# Patient Record
Sex: MALE | Race: WHITE | Employment: FULL TIME | URBAN - METROPOLITAN AREA
[De-identification: names, ages, dates, MRNs, and addresses within clinical notes are randomized per-mention and may not be internally consistent; named-entity substitution may affect disease eponyms.]

---

## 2018-08-24 NOTE — LETTER
Here is the plan from today's visit    1. Benign essential hypertension  Discussed with and benefits of treating versus not treating hypertension potential adverse side effects of medication restarting asked him to follow-up for rechecking blood pressure and BMP.  - lisinopril (PRINIVIL/ZESTRIL) 5 MG tablet; Take 1 tablet (5 mg) by mouth daily  Dispense: 30 tablet; Refill: 1  - Comprehensive Metabolic Panel ([facility])  - Lipid Cascade ([facility])  - Hemoglobin A1c ([facility])    2. Dysfunction of right eustachian tube  Likely the result of allergic rhinitis.  Advised treatment with OTC Flonase, OTC Allegra and OTC Sudafed.    3. Obesity (BMI 30.0-34.9)  Discussed in detail regarding starting a diet in defining abstinence for himself.  Advised him he could either go with a low carbohydrate diet or Mediterranean diet.  Or go with the program at [facility] since he has signed up already.  Advised him to follow-up in about 4-6 weeks to see how things are going and consider adjunctive medication if things are if he is not progressing.      Please call or return to clinic if your symptoms don't go away.    Follow up plan  Please make a clinic appointment for follow up with me (DEEPA GARCIA) in 2-4 weeks for recheck.    Thank you for coming to [facility] today.  Lab Testing:  **If you had lab testing today and your results are reassuring or normal they will be mailed to you or sent through [employer] within 7 days.   **If the lab tests need quick action we will call you with the results.  The phone number we will call with results is # [phone] (home) [phone] (work). If this is not the best number please call our clinic and change the number.  Medication Refills:  If you need any refills please call your pharmacy and they will contact us.   If you need to  your refill at a new pharmacy, please contact the new pharmacy directly. The new pharmacy will help you get your medications transferred faster.  [place] [address], [place], [place]    Authorization for Surgical Operation and Procedure                               I hereby authorize Maria L Montero MD, my physician and his/her assistants (if applicable), which may include medical students, residents, and/or fellows, to perform the following surgical operation/ procedure and administer such anesthesia as may be determined necessary by my physician: Operation/Procedure name (s) COLONOSCOPY on Ridge Ambrocio   2.   I recognize that during the surgical operation/procedure, unforeseen conditions may necessitate additional or different procedures than those listed above.  I, therefore, further authorize and request that the above-named surgeon, assistants, or designees perform such procedures as are, in their judgment, necessary and desirable.    3.   My surgeon/physician has discussed prior to my surgery the potential benefits, risks and side effects of this procedure; the likelihood of achieving goals; and potential problems that might occur during recuperation.  They also discussed reasonable alternatives to the procedure, including risks, benefits, and side effects related to the alternatives and risks related to not receiving this procedure.  I have had all my questions answered and I acknowledge that no guarantee has been made as to the result that may be obtained.    4.   Should the need arise during my operation/procedure, which includes change of level of care prior to discharge, I also consent to the administration of blood and/or blood products.  Further, I understand that despite careful testing and screening of blood or blood products by collecting agencies, I may still be subject to ill effects as a result of receiving a blood transfusion and/or blood products.  The following are some, but not all, of the potential risks that can occur: fever and allergic reactions, hemolytic reactions, transmission of    Scheduling:  If you have any concerns about today's visit or wish to schedule another appointment please call our office during normal business hours [phone] (8-5:00 M-F)  If a referral was made to a [facility] and you don't get a call from central scheduling please call [phone].  If a Mammogram was ordered for you at [facility] call [phone] to schedule or change your appointment.  If you had an XRay/CT/Ultrasound/MRI ordered the number is [phone] to schedule or change your radiology appointment.   Medical Concerns:  If you have urgent medical concerns please call [phone] at any time of the day.    Richy Dorsey MD     diseases such as Hepatitis, AIDS and Cytomegalovirus (CMV) and fluid overload.  In the event that I wish to have an autologous transfusion of my own blood, or a directed donor transfusion, I will discuss this with my physician.  Check only if Refusing Blood or Blood Products  I understand refusal of blood or blood products as deemed necessary by my physician may have serious consequences to my condition to include possible death. I hereby assume responsibility for my refusal and release the hospital, its personnel, and my physicians from any responsibility for the consequences of my refusal.    o  Refuse   5.   I authorize the use of any specimen, organs, tissues, body parts or foreign objects that may be removed from my body during the operation/procedure for diagnosis, research or teaching purposes and their subsequent disposal by hospital authorities.  I also authorize the release of specimen test results and/or written reports to my treating physician on the hospital medical staff or other referring or consulting physicians involved in my care, at the discretion of the Pathologist or my treating physician.    6.   I consent to the photographing or videotaping of the operations or procedures to be performed, including appropriate portions of my body for medical, scientific, or educational purposes, provided my identity is not revealed by the pictures or by descriptive texts accompanying them.  If the procedure has been photographed/videotaped, the surgeon will obtain the original picture, image, videotape or CD.  The hospital will not be responsible for storage, release or maintenance of the picture, image, tape or CD.    7.   I consent to the presence of a  or observers in the operating room as deemed necessary by my physician or their designees.    8.   I recognize that in the event my procedure results in extended X-Ray/fluoroscopy time, I may develop a skin reaction.    9. If I have a Do Not  Attempt Resuscitation (DNAR) order in place, that status will be suspended while in the operating room, procedural suite, and during the recovery period unless otherwise explicitly stated by me (or a person authorized to consent on my behalf). The surgeon or my attending physician will determine when the applicable recovery period ends for purposes of reinstating the DNAR order.  10. Patients having a sterilization procedure: I understand that if the procedure is successful the results will be permanent and it will therefore be impossible for me to inseminate, conceive, or bear children.  I also understand that the procedure is intended to result in sterility, although the result has not been guaranteed.   11. I acknowledge that my physician has explained sedation/analgesia administration to me including the risk and benefits I consent to the administration of sedation/analgesia as may be necessary or desirable in the judgment of my physician.    I CERTIFY THAT I HAVE READ AND FULLY UNDERSTAND THE ABOVE CONSENT TO OPERATION and/or OTHER PROCEDURE.     ____________________________________  _________________________________        ______________________________  Signature of Patient    Signature of Responsible Person                Printed Name of Responsible Person                                      ____________________________________  _____________________________                ________________________________  Signature of Witness        Date  Time         Relationship to Patient    STATEMENT OF PHYSICIAN My signature below affirms that prior to the time of the procedure; I have explained to the patient and/or his/her legal representative, the risks and benefits involved in the proposed treatment and any reasonable alternative to the proposed treatment. I have also explained the risks and benefits involved in refusal of the proposed treatment and alternatives to the proposed treatment and have answered the  patient's questions. If I have a significant financial interest in a co-management agreement or a significant financial interest in any product or implant, or other significant relationship used in this procedure/surgery, I have disclosed this and had a discussion with my patient.     _____________________________________________________              _____________________________  (Signature of Physician)                                                                                         (Date)                                   (Time)  Patient Name: Ridge Ambrocio      : 1980      Printed: 2025     Medical Record #: [id number]                                      Page 1 of 1

## 2018-10-30 ENCOUNTER — LAB ENCOUNTER (OUTPATIENT)
Dept: LAB | Facility: REFERENCE LAB | Age: 38
End: 2018-10-30
Attending: FAMILY MEDICINE
Payer: COMMERCIAL

## 2018-10-30 ENCOUNTER — OFFICE VISIT (OUTPATIENT)
Dept: FAMILY MEDICINE CLINIC | Facility: CLINIC | Age: 38
End: 2018-10-30
Payer: COMMERCIAL

## 2018-10-30 VITALS
OXYGEN SATURATION: 98 % | DIASTOLIC BLOOD PRESSURE: 68 MMHG | HEART RATE: 93 BPM | SYSTOLIC BLOOD PRESSURE: 122 MMHG | BODY MASS INDEX: 20.47 KG/M2 | WEIGHT: 143 LBS | HEIGHT: 70 IN

## 2018-10-30 DIAGNOSIS — G25.0 BENIGN ESSENTIAL TREMOR: ICD-10-CM

## 2018-10-30 DIAGNOSIS — Z23 NEED FOR VACCINATION: ICD-10-CM

## 2018-10-30 DIAGNOSIS — Z00.01 ENCOUNTER FOR ROUTINE ADULT HEALTH EXAMINATION WITH ABNORMAL FINDINGS: ICD-10-CM

## 2018-10-30 DIAGNOSIS — Z00.01 ENCOUNTER FOR ROUTINE ADULT HEALTH EXAMINATION WITH ABNORMAL FINDINGS: Primary | ICD-10-CM

## 2018-10-30 PROCEDURE — 90686 IIV4 VACC NO PRSV 0.5 ML IM: CPT | Performed by: FAMILY MEDICINE

## 2018-10-30 PROCEDURE — 80061 LIPID PANEL: CPT | Performed by: FAMILY MEDICINE

## 2018-10-30 PROCEDURE — 36415 COLL VENOUS BLD VENIPUNCTURE: CPT | Performed by: FAMILY MEDICINE

## 2018-10-30 PROCEDURE — 90471 IMMUNIZATION ADMIN: CPT | Performed by: FAMILY MEDICINE

## 2018-10-30 PROCEDURE — 80050 GENERAL HEALTH PANEL: CPT | Performed by: FAMILY MEDICINE

## 2018-10-30 PROCEDURE — 83036 HEMOGLOBIN GLYCOSYLATED A1C: CPT | Performed by: FAMILY MEDICINE

## 2018-10-30 PROCEDURE — 99385 PREV VISIT NEW AGE 18-39: CPT | Performed by: FAMILY MEDICINE

## 2018-10-30 NOTE — PROGRESS NOTES
Corine Flanagan is a 43year old male who presents for a complete physical exam.   HPI:     Concerns: Has had a slight tremor that is worse in the morning and not every day. Feels it only with controlled actions.  Has been present for a few weeks but ne Drug use: No     Occ: [employer], works in sales currently for industrial engineering  : Yes Children: two sons        EXAM:   Wt Readings from Last 6 Encounters:  10/30/18 : 143 lb    Body mass index is 20.52 kg/m².     /68   Pulse 93   Ht 7 of the week   -Importance of regular exercise and weight loss  -Diabetes screening which he desires  -Cholesterol screening which he desires  -Recommendation for yearly influenza vaccine  -Need for Tdap once as an adult and Td booster every 10 years  -Need

## 2018-10-30 NOTE — PATIENT INSTRUCTIONS
Prevention Guidelines, Men Ages 25 to 44  Screening tests and vaccines are an important part of managing your health. A screening test is done to find possible disorders or diseases in people who don't have any symptoms.  The goal is to find a disease ear Vaccines Who needs it How often   Chickenpox (varicella) All men in this age group who have no record of this infection or vaccine 2 doses; the second dose should be given at least 4 weeks after the first dose   Hepatitis A Men at increased risk for infect Sexually transmitted infection prevention Men who are sexually active At routine exams   Skin cancer Prevention of skin cancer in fair-skinned adults through age 25 At routine exams   1Those who are 25years of age, who are not up-to-date on their childhoo The cause of ET isn’t known. However, one theory suggests that your cerebellum and other parts of your brain are not communicating correctly. The cerebellum is a part of the brain that controls muscle coordination. What are the symptoms of ET?   If you hav Propanolol and primidone are 2 medicines often prescribed to treat ET. Propanolol blocks the stimulating action of neurotransmitters to calm your trembling. Primidone is a common antiseizure medicine that also controls the actions of neurotransmitters.   Shady Morillo · ET is a neurological disorder that causes your hands, head, trunk, voice, or legs to shake rhythmically. The cause is not known, but it is often passed down from a parent to a child.   · ET is sometimes confused with other types of tremor, so getting the

## 2018-12-26 ENCOUNTER — OFFICE VISIT (OUTPATIENT)
Dept: FAMILY MEDICINE CLINIC | Facility: CLINIC | Age: 38
End: 2018-12-26
Payer: COMMERCIAL

## 2018-12-26 ENCOUNTER — APPOINTMENT (OUTPATIENT)
Dept: LAB | Facility: REFERENCE LAB | Age: 38
End: 2018-12-26
Attending: FAMILY MEDICINE
Payer: COMMERCIAL

## 2018-12-26 VITALS
DIASTOLIC BLOOD PRESSURE: 70 MMHG | BODY MASS INDEX: 20.33 KG/M2 | HEART RATE: 72 BPM | SYSTOLIC BLOOD PRESSURE: 128 MMHG | HEIGHT: 70 IN | WEIGHT: 142 LBS | OXYGEN SATURATION: 98 %

## 2018-12-26 DIAGNOSIS — Z91.89 RISK OF EXPOSURE TO LYME DISEASE: Primary | ICD-10-CM

## 2018-12-26 DIAGNOSIS — R07.81 RIB PAIN ON LEFT SIDE: ICD-10-CM

## 2018-12-26 DIAGNOSIS — Z91.89 RISK OF EXPOSURE TO LYME DISEASE: ICD-10-CM

## 2018-12-26 PROCEDURE — 86618 LYME DISEASE ANTIBODY: CPT | Performed by: FAMILY MEDICINE

## 2018-12-26 PROCEDURE — 36415 COLL VENOUS BLD VENIPUNCTURE: CPT | Performed by: FAMILY MEDICINE

## 2018-12-26 PROCEDURE — 99214 OFFICE O/P EST MOD 30 MIN: CPT | Performed by: FAMILY MEDICINE

## 2018-12-26 NOTE — PROGRESS NOTES
CC:  Patient presents with:  Trauma (cardiovascular, musculoskeletal): patient states he dropped some weight on his chest at gym, states his left ribs look different and sometimes sore  Other: wife had a positive test for lyme disease and would like to get Transportation needs - non-medical: Not on file    Occupational History      Not on file    Tobacco Use      Smoking status: Never Smoker      Smokeless tobacco: Never Used    Substance and Sexual Activity      Alcohol use: Yes        Frequency: 4 or more his wife recently testing positive for chronic Lyme disease, will check testing today  - LYME DISEASE, TOTAL AB W RFLX; Future    2.  Rib pain on left side    - Improving with no signs of serious injury and no breathing difficulties  - Continue to monitor f

## 2021-08-31 ENCOUNTER — LAB ENCOUNTER (OUTPATIENT)
Dept: LAB | Facility: REFERENCE LAB | Age: 41
End: 2021-08-31
Attending: FAMILY MEDICINE
Payer: COMMERCIAL

## 2021-08-31 ENCOUNTER — OFFICE VISIT (OUTPATIENT)
Dept: FAMILY MEDICINE CLINIC | Facility: CLINIC | Age: 41
End: 2021-08-31
Payer: COMMERCIAL

## 2021-08-31 VITALS
BODY MASS INDEX: 20.33 KG/M2 | DIASTOLIC BLOOD PRESSURE: 78 MMHG | WEIGHT: 142 LBS | OXYGEN SATURATION: 98 % | SYSTOLIC BLOOD PRESSURE: 126 MMHG | HEIGHT: 70 IN | HEART RATE: 79 BPM

## 2021-08-31 DIAGNOSIS — Z00.00 ENCOUNTER FOR ROUTINE ADULT HEALTH EXAMINATION WITHOUT ABNORMAL FINDINGS: Primary | ICD-10-CM

## 2021-08-31 DIAGNOSIS — R51.9 RECURRENT OCCIPITAL HEADACHE: ICD-10-CM

## 2021-08-31 DIAGNOSIS — Z00.00 ENCOUNTER FOR ROUTINE ADULT HEALTH EXAMINATION WITHOUT ABNORMAL FINDINGS: ICD-10-CM

## 2021-08-31 LAB
ALBUMIN SERPL-MCNC: 3.9 G/DL (ref 3.4–5)
ALBUMIN/GLOB SERPL: 1 {RATIO} (ref 1–2)
ALP LIVER SERPL-CCNC: 53 U/L
ALT SERPL-CCNC: 39 U/L
ANION GAP SERPL CALC-SCNC: 3 MMOL/L (ref 0–18)
AST SERPL-CCNC: 23 U/L (ref 15–37)
BASOPHILS # BLD AUTO: 0.04 X10(3) UL (ref 0–0.2)
BASOPHILS NFR BLD AUTO: 0.9 %
BILIRUB SERPL-MCNC: 0.5 MG/DL (ref 0.1–2)
BUN BLD-MCNC: 9 MG/DL (ref 7–18)
BUN/CREAT SERPL: 10.8 (ref 10–20)
CALCIUM BLD-MCNC: 8.9 MG/DL (ref 8.5–10.1)
CHLORIDE SERPL-SCNC: 103 MMOL/L (ref 98–112)
CHOLEST SMN-MCNC: 211 MG/DL (ref ?–200)
CO2 SERPL-SCNC: 30 MMOL/L (ref 21–32)
COMPLEXED PSA SERPL-MCNC: 0.62 NG/ML (ref ?–4)
CREAT BLD-MCNC: 0.83 MG/DL
DEPRECATED RDW RBC AUTO: 36.9 FL (ref 35.1–46.3)
EOSINOPHIL # BLD AUTO: 0.15 X10(3) UL (ref 0–0.7)
EOSINOPHIL NFR BLD AUTO: 3.5 %
ERYTHROCYTE [DISTWIDTH] IN BLOOD BY AUTOMATED COUNT: 11.6 % (ref 11–15)
EST. AVERAGE GLUCOSE BLD GHB EST-MCNC: 100 MG/DL (ref 68–126)
GLOBULIN PLAS-MCNC: 3.8 G/DL (ref 2.8–4.4)
GLUCOSE BLD-MCNC: 94 MG/DL (ref 70–99)
HBA1C MFR BLD HPLC: 5.1 % (ref ?–5.7)
HCT VFR BLD AUTO: 43.7 %
HCV AB SERPL QL IA: NONREACTIVE
HDLC SERPL-MCNC: 72 MG/DL (ref 40–59)
HGB BLD-MCNC: 14.6 G/DL
IMM GRANULOCYTES # BLD AUTO: 0.01 X10(3) UL (ref 0–1)
IMM GRANULOCYTES NFR BLD: 0.2 %
LDLC SERPL CALC-MCNC: 124 MG/DL (ref ?–100)
LYMPHOCYTES # BLD AUTO: 1.12 X10(3) UL (ref 1–4)
LYMPHOCYTES NFR BLD AUTO: 25.9 %
M PROTEIN MFR SERPL ELPH: 7.7 G/DL (ref 6.4–8.2)
MCH RBC QN AUTO: 28.9 PG (ref 26–34)
MCHC RBC AUTO-ENTMCNC: 33.4 G/DL (ref 31–37)
MCV RBC AUTO: 86.5 FL
MONOCYTES # BLD AUTO: 0.32 X10(3) UL (ref 0.1–1)
MONOCYTES NFR BLD AUTO: 7.4 %
NEUTROPHILS # BLD AUTO: 2.68 X10 (3) UL (ref 1.5–7.7)
NEUTROPHILS # BLD AUTO: 2.68 X10(3) UL (ref 1.5–7.7)
NEUTROPHILS NFR BLD AUTO: 62.1 %
NONHDLC SERPL-MCNC: 139 MG/DL (ref ?–130)
OSMOLALITY SERPL CALC.SUM OF ELEC: 280 MOSM/KG (ref 275–295)
PATIENT FASTING Y/N/NP: YES
PATIENT FASTING Y/N/NP: YES
PLATELET # BLD AUTO: 171 10(3)UL (ref 150–450)
POTASSIUM SERPL-SCNC: 3.9 MMOL/L (ref 3.5–5.1)
RBC # BLD AUTO: 5.05 X10(6)UL
SODIUM SERPL-SCNC: 136 MMOL/L (ref 136–145)
TRIGL SERPL-MCNC: 85 MG/DL (ref 30–149)
VLDLC SERPL CALC-MCNC: 15 MG/DL (ref 0–30)
WBC # BLD AUTO: 4.3 X10(3) UL (ref 4–11)

## 2021-08-31 PROCEDURE — 3078F DIAST BP <80 MM HG: CPT | Performed by: FAMILY MEDICINE

## 2021-08-31 PROCEDURE — 3074F SYST BP LT 130 MM HG: CPT | Performed by: FAMILY MEDICINE

## 2021-08-31 PROCEDURE — 80053 COMPREHEN METABOLIC PANEL: CPT

## 2021-08-31 PROCEDURE — 85025 COMPLETE CBC W/AUTO DIFF WBC: CPT

## 2021-08-31 PROCEDURE — 80061 LIPID PANEL: CPT

## 2021-08-31 PROCEDURE — 99396 PREV VISIT EST AGE 40-64: CPT | Performed by: FAMILY MEDICINE

## 2021-08-31 PROCEDURE — 36415 COLL VENOUS BLD VENIPUNCTURE: CPT

## 2021-08-31 PROCEDURE — 3008F BODY MASS INDEX DOCD: CPT | Performed by: FAMILY MEDICINE

## 2021-08-31 PROCEDURE — 83036 HEMOGLOBIN GLYCOSYLATED A1C: CPT

## 2021-08-31 PROCEDURE — 86803 HEPATITIS C AB TEST: CPT

## 2021-08-31 NOTE — PROGRESS NOTES
Yamil Gross is a 42year old male who presents for a complete physical exam.   HPI:     Concerns: Had a sinus infection in July and was treated with Augmentin for 10 days. Symptoms have mostly improved.    Has also had some intermittent right sided Grandmother         Alzheimer's   • Dementia Paternal Grandmother         Lewy Body    • Colon Cancer Paternal Grandfather    • Other (Parkinson's) Maternal Uncle       Social History:  Social History    Tobacco Use      Smoking status: Never Smoker      S HCV Antibody [E]      Hemoglobin A1C (Glycohemoglobin) [E]      CBC W Differential W Platelet [E]    Recommend continuing heat and stretches for occipital headaches.  Neurologic exam normal today and no red flag symptoms present so will defer imaging, but n

## 2022-10-12 ENCOUNTER — LAB ENCOUNTER (OUTPATIENT)
Dept: LAB | Age: 42
End: 2022-10-12
Attending: FAMILY MEDICINE
Payer: COMMERCIAL

## 2022-10-12 ENCOUNTER — NURSE ONLY (OUTPATIENT)
Dept: FAMILY MEDICINE CLINIC | Facility: CLINIC | Age: 42
End: 2022-10-12
Payer: COMMERCIAL

## 2022-10-12 DIAGNOSIS — Z01.84 IMMUNITY STATUS TESTING: ICD-10-CM

## 2022-10-12 LAB
RUBV IGG SER QL: POSITIVE
RUBV IGG SER-ACNC: 89.5 IU/ML (ref 10–?)

## 2022-10-12 PROCEDURE — 86615 BORDETELLA ANTIBODY: CPT

## 2022-10-12 PROCEDURE — 86648 DIPHTHERIA ANTIBODY: CPT

## 2022-10-12 PROCEDURE — 90715 TDAP VACCINE 7 YRS/> IM: CPT | Performed by: FAMILY MEDICINE

## 2022-10-12 PROCEDURE — 86762 RUBELLA ANTIBODY: CPT

## 2022-10-12 PROCEDURE — 90471 IMMUNIZATION ADMIN: CPT | Performed by: FAMILY MEDICINE

## 2022-10-12 PROCEDURE — 36415 COLL VENOUS BLD VENIPUNCTURE: CPT

## 2022-10-12 PROCEDURE — 86735 MUMPS ANTIBODY: CPT

## 2022-10-12 PROCEDURE — 86774 TETANUS ANTIBODY: CPT

## 2022-10-12 PROCEDURE — 86765 RUBEOLA ANTIBODY: CPT

## 2022-10-14 LAB
B. PERTUSSIS AB, IGA W/REFLEX: 0.2 IV
B. PERTUSSIS AB, IGG W/ REFLEX: 1.53 IV
DIPHTHERIA ANTIBODY, IGG: 0.5 IU/ML
MEV IGG SER-ACNC: 198 AU/ML (ref 16.5–?)
MUV IGG SER IA-ACNC: 64.9 AU/ML (ref 11–?)
TETANUS ANTIBODY, IGG: 2.6 IU/ML

## 2022-10-15 LAB — B. PERTUSSIS, IGG IB PT: POSITIVE

## 2022-12-07 ENCOUNTER — OFFICE VISIT (OUTPATIENT)
Dept: FAMILY MEDICINE CLINIC | Facility: CLINIC | Age: 42
End: 2022-12-07
Payer: COMMERCIAL

## 2022-12-07 VITALS
HEIGHT: 70 IN | HEART RATE: 67 BPM | WEIGHT: 155 LBS | SYSTOLIC BLOOD PRESSURE: 122 MMHG | DIASTOLIC BLOOD PRESSURE: 76 MMHG | BODY MASS INDEX: 22.19 KG/M2 | OXYGEN SATURATION: 97 %

## 2022-12-07 DIAGNOSIS — M79.644 BILATERAL THUMB PAIN: ICD-10-CM

## 2022-12-07 DIAGNOSIS — M79.652 PAIN OF LEFT THIGH: ICD-10-CM

## 2022-12-07 DIAGNOSIS — Z12.5 PROSTATE CANCER SCREENING: ICD-10-CM

## 2022-12-07 DIAGNOSIS — M79.645 BILATERAL THUMB PAIN: ICD-10-CM

## 2022-12-07 DIAGNOSIS — Z00.00 ENCOUNTER FOR ROUTINE ADULT HEALTH EXAMINATION WITHOUT ABNORMAL FINDINGS: Primary | ICD-10-CM

## 2022-12-07 PROCEDURE — 3074F SYST BP LT 130 MM HG: CPT | Performed by: FAMILY MEDICINE

## 2022-12-07 PROCEDURE — 99396 PREV VISIT EST AGE 40-64: CPT | Performed by: FAMILY MEDICINE

## 2022-12-07 PROCEDURE — 3008F BODY MASS INDEX DOCD: CPT | Performed by: FAMILY MEDICINE

## 2022-12-07 PROCEDURE — 3078F DIAST BP <80 MM HG: CPT | Performed by: FAMILY MEDICINE

## 2023-07-25 ENCOUNTER — LAB ENCOUNTER (OUTPATIENT)
Dept: LAB | Facility: REFERENCE LAB | Age: 43
End: 2023-07-25
Attending: FAMILY MEDICINE
Payer: COMMERCIAL

## 2023-07-25 ENCOUNTER — OFFICE VISIT (OUTPATIENT)
Facility: CLINIC | Age: 43
End: 2023-07-25
Payer: COMMERCIAL

## 2023-07-25 VITALS
WEIGHT: 153 LBS | OXYGEN SATURATION: 98 % | HEIGHT: 70 IN | SYSTOLIC BLOOD PRESSURE: 126 MMHG | DIASTOLIC BLOOD PRESSURE: 78 MMHG | BODY MASS INDEX: 21.9 KG/M2 | HEART RATE: 75 BPM

## 2023-07-25 DIAGNOSIS — M54.50 CHRONIC BILATERAL LOW BACK PAIN WITHOUT SCIATICA: ICD-10-CM

## 2023-07-25 DIAGNOSIS — G89.29 CHRONIC BILATERAL LOW BACK PAIN WITHOUT SCIATICA: ICD-10-CM

## 2023-07-25 DIAGNOSIS — Z00.00 ENCOUNTER FOR ROUTINE ADULT HEALTH EXAMINATION WITHOUT ABNORMAL FINDINGS: ICD-10-CM

## 2023-07-25 DIAGNOSIS — Z00.00 ENCOUNTER FOR ROUTINE ADULT HEALTH EXAMINATION WITHOUT ABNORMAL FINDINGS: Primary | ICD-10-CM

## 2023-07-25 DIAGNOSIS — Z12.5 PROSTATE CANCER SCREENING: ICD-10-CM

## 2023-07-25 DIAGNOSIS — N50.89 TESTICULAR MASS: ICD-10-CM

## 2023-07-25 DIAGNOSIS — M54.6 ACUTE BILATERAL THORACIC BACK PAIN: ICD-10-CM

## 2023-07-25 LAB
ALBUMIN SERPL-MCNC: 3.4 G/DL (ref 3.4–5)
ALBUMIN/GLOB SERPL: 1 {RATIO} (ref 1–2)
ALP LIVER SERPL-CCNC: 63 U/L
ALT SERPL-CCNC: 41 U/L
ANION GAP SERPL CALC-SCNC: 5 MMOL/L (ref 0–18)
AST SERPL-CCNC: 25 U/L (ref 15–37)
BASOPHILS # BLD AUTO: 0.06 X10(3) UL (ref 0–0.2)
BASOPHILS NFR BLD AUTO: 1 %
BILIRUB SERPL-MCNC: 0.3 MG/DL (ref 0.1–2)
BUN BLD-MCNC: 13 MG/DL (ref 7–18)
BUN/CREAT SERPL: 13.3 (ref 10–20)
CALCIUM BLD-MCNC: 8.8 MG/DL (ref 8.5–10.1)
CHLORIDE SERPL-SCNC: 105 MMOL/L (ref 98–112)
CHOLEST SERPL-MCNC: 181 MG/DL (ref ?–200)
CO2 SERPL-SCNC: 29 MMOL/L (ref 21–32)
COMPLEXED PSA SERPL-MCNC: 0.92 NG/ML (ref ?–4)
CREAT BLD-MCNC: 0.98 MG/DL
DEPRECATED RDW RBC AUTO: 35.2 FL (ref 35.1–46.3)
EGFRCR SERPLBLD CKD-EPI 2021: 98 ML/MIN/1.73M2 (ref 60–?)
EOSINOPHIL # BLD AUTO: 0.52 X10(3) UL (ref 0–0.7)
EOSINOPHIL NFR BLD AUTO: 8.3 %
ERYTHROCYTE [DISTWIDTH] IN BLOOD BY AUTOMATED COUNT: 11.2 % (ref 11–15)
EST. AVERAGE GLUCOSE BLD GHB EST-MCNC: 97 MG/DL (ref 68–126)
FASTING PATIENT LIPID ANSWER: NO
FASTING STATUS PATIENT QL REPORTED: NO
GLOBULIN PLAS-MCNC: 3.5 G/DL (ref 2.8–4.4)
GLUCOSE BLD-MCNC: 106 MG/DL (ref 70–99)
HBA1C MFR BLD: 5 % (ref ?–5.7)
HCT VFR BLD AUTO: 40.5 %
HDLC SERPL-MCNC: 58 MG/DL (ref 40–59)
HGB BLD-MCNC: 14 G/DL
IMM GRANULOCYTES # BLD AUTO: 0.02 X10(3) UL (ref 0–1)
IMM GRANULOCYTES NFR BLD: 0.3 %
LDLC SERPL CALC-MCNC: 97 MG/DL (ref ?–100)
LYMPHOCYTES # BLD AUTO: 1.89 X10(3) UL (ref 1–4)
LYMPHOCYTES NFR BLD AUTO: 30.2 %
MCH RBC QN AUTO: 29.7 PG (ref 26–34)
MCHC RBC AUTO-ENTMCNC: 34.6 G/DL (ref 31–37)
MCV RBC AUTO: 85.8 FL
MONOCYTES # BLD AUTO: 0.41 X10(3) UL (ref 0.1–1)
MONOCYTES NFR BLD AUTO: 6.6 %
NEUTROPHILS # BLD AUTO: 3.35 X10 (3) UL (ref 1.5–7.7)
NEUTROPHILS # BLD AUTO: 3.35 X10(3) UL (ref 1.5–7.7)
NEUTROPHILS NFR BLD AUTO: 53.6 %
NONHDLC SERPL-MCNC: 123 MG/DL (ref ?–130)
OSMOLALITY SERPL CALC.SUM OF ELEC: 289 MOSM/KG (ref 275–295)
PLATELET # BLD AUTO: 183 10(3)UL (ref 150–450)
POTASSIUM SERPL-SCNC: 3.6 MMOL/L (ref 3.5–5.1)
PROT SERPL-MCNC: 6.9 G/DL (ref 6.4–8.2)
RBC # BLD AUTO: 4.72 X10(6)UL
SODIUM SERPL-SCNC: 139 MMOL/L (ref 136–145)
TRIGL SERPL-MCNC: 153 MG/DL (ref 30–149)
VLDLC SERPL CALC-MCNC: 25 MG/DL (ref 0–30)
WBC # BLD AUTO: 6.3 X10(3) UL (ref 4–11)

## 2023-07-25 PROCEDURE — 80061 LIPID PANEL: CPT

## 2023-07-25 PROCEDURE — 3074F SYST BP LT 130 MM HG: CPT | Performed by: FAMILY MEDICINE

## 2023-07-25 PROCEDURE — 85025 COMPLETE CBC W/AUTO DIFF WBC: CPT

## 2023-07-25 PROCEDURE — 3078F DIAST BP <80 MM HG: CPT | Performed by: FAMILY MEDICINE

## 2023-07-25 PROCEDURE — 36415 COLL VENOUS BLD VENIPUNCTURE: CPT

## 2023-07-25 PROCEDURE — 3008F BODY MASS INDEX DOCD: CPT | Performed by: FAMILY MEDICINE

## 2023-07-25 PROCEDURE — 80053 COMPREHEN METABOLIC PANEL: CPT

## 2023-07-25 PROCEDURE — 99396 PREV VISIT EST AGE 40-64: CPT | Performed by: FAMILY MEDICINE

## 2023-07-25 PROCEDURE — 99213 OFFICE O/P EST LOW 20 MIN: CPT | Performed by: FAMILY MEDICINE

## 2023-07-25 PROCEDURE — 83036 HEMOGLOBIN GLYCOSYLATED A1C: CPT

## 2023-08-03 ENCOUNTER — TELEPHONE (OUTPATIENT)
Facility: CLINIC | Age: 43
End: 2023-08-03

## 2023-08-18 ENCOUNTER — HOSPITAL ENCOUNTER (OUTPATIENT)
Dept: ULTRASOUND IMAGING | Age: 43
Discharge: HOME | End: 2023-08-18
Attending: FAMILY MEDICINE
Payer: COMMERCIAL

## 2023-08-18 DIAGNOSIS — N50.89 TESTICULAR MASS: ICD-10-CM

## 2023-08-18 PROCEDURE — 76870 US EXAM SCROTUM: CPT | Performed by: FAMILY MEDICINE

## 2023-08-18 PROCEDURE — 93975 VASCULAR STUDY: CPT | Performed by: FAMILY MEDICINE

## 2023-08-25 ENCOUNTER — HOSPITAL ENCOUNTER (OUTPATIENT)
Dept: GENERAL RADIOLOGY | Age: 43
Discharge: HOME | End: 2023-08-25
Attending: FAMILY MEDICINE
Payer: COMMERCIAL

## 2023-08-25 DIAGNOSIS — G89.29 CHRONIC MIDLINE THORACIC BACK PAIN: ICD-10-CM

## 2023-08-25 DIAGNOSIS — M54.6 CHRONIC MIDLINE THORACIC BACK PAIN: ICD-10-CM

## 2023-08-25 DIAGNOSIS — M54.50 LUMBAR BACK PAIN: ICD-10-CM

## 2023-08-25 PROCEDURE — 72072 X-RAY EXAM THORAC SPINE 3VWS: CPT | Performed by: FAMILY MEDICINE

## 2023-08-25 PROCEDURE — 72110 X-RAY EXAM L-2 SPINE 4/>VWS: CPT | Performed by: FAMILY MEDICINE

## 2023-10-11 ENCOUNTER — TELEPHONE (OUTPATIENT)
Dept: PHYSICAL THERAPY | Facility: HOSPITAL | Age: 43
End: 2023-10-11

## 2023-10-12 ENCOUNTER — OFFICE VISIT (OUTPATIENT)
Dept: PHYSICAL THERAPY | Facility: HOSPITAL | Age: 43
End: 2023-10-12
Attending: FAMILY MEDICINE
Payer: COMMERCIAL

## 2023-10-12 DIAGNOSIS — G89.29 CHRONIC BILATERAL THORACIC BACK PAIN: Primary | ICD-10-CM

## 2023-10-12 DIAGNOSIS — M54.6 CHRONIC BILATERAL THORACIC BACK PAIN: Primary | ICD-10-CM

## 2023-10-12 DIAGNOSIS — M54.50 LUMBAR BACK PAIN: ICD-10-CM

## 2023-10-12 PROCEDURE — 97162 PT EVAL MOD COMPLEX 30 MIN: CPT

## 2023-10-12 PROCEDURE — 97110 THERAPEUTIC EXERCISES: CPT

## 2023-10-17 NOTE — PROGRESS NOTES
Diagnosis:   Chronic bilateral thoracic back pain (M54.6,G89.29)  Lumbar back pain (M54.50)      Referring Provider: Joshua Burns  Date of Evaluation:    10/12/2023    Precautions:   Family history of CA and Heart attack  Next MD visit: none     Date of Surgery: n/a     Insurance Primary/Secondary: [address] [place] [address] / N/A     # Auth Visits: 5            Subjective: Pt was very sore after last session and has been more tender since. He took some time to recover before completing the exercises. Has felt more pain overall the past week. Pain: 4/10 beginning of session; after mobilization 2/10, 3/10 with extension       Objective: improved active lumbo-thoracic mobility just feels pull with R SB, ribs 5-8 more tender but less flared compared to first visit. Tenderness along anterior rib angle on left.     ----------  Objective info 10/12  AROM: (* denotes performed with pain)  Flexion: 80%, slight straining when returning to neutral (around shoulder blades)   Extension: 75%, slight strain when returning to neutral (around shoulder blades)  Sidebending: R 80%%; L 80%  Rotation: R 75%; L 75%* dull-sharp     Shoulder AROM: WNL  LE AROM: WNL    Accessory motion:   PA mob thoracic:decreased mobility, increased mm guarding along ~T7-10  Rib springing: decreased rib mobility in lower left ribs with increased discomfort    Palpation: positive rib flaring on left lower ribs    Gross Shoulder Strength: WNL, slight limitations in ER L>R  Gross LE Strength: WNL, slight limitations in hip flex   ----------    Assessment: Pt reported increased soreness after last session, therefore focused on gentle manual work today. Ribs ~5-8 on the left remain tender, but are less flared compared to the first visit. Additionally, he has tenderness along the anterior rib angle on his left and tenderness to the serratus anterior, oblique, lats on the left.  Updated HEP to promote improved mobility, and will continue to address mobility restrictions as able.       Goals:   Goals: (to be met in 10 visits)   Pt will improve transversus abdominis recruitment to perform proper isometric contraction without requiring verbal or tactile cuing to promote advancement of therex   Pt will demonstrate good understanding of proper posture and body mechanics to decrease pain and improve spinal safety   Pt to improve thoracic spine AROM to allow improved ability to inhale without increased pain. Pt will improve thoracolumbar spine AROM flexion to allow increase ease with bending forward to don shoes   Pt will have decreased paraspinal mm tension to tolerate sitting/standing >30 minutes for work and home activities   Pt will be independent and compliant with comprehensive HEP to maintain progress achieved in PT     Plan: Manual Therapy, Neuromuscular Re-education, Therapeutic Activities, Therapeutic Exercise, and Home Exercise Program instruction  Date: 10/18/2023  TX#: 2/10 Date:                 TX#: 3/ Date:                 TX#: 4/ Date:                 TX#: 5/ Date:    Tx#: 6/   MT 25'  G1-2 rib springing   G1-2 thoracic PA mob  STM obliques, scalenes, along rib angle anteriorly       TE 20'  Assessment- rib mobility, breathing assessment  Cat/cow x20  Standing against FR- swimmers and clams x20  Skip pose, fwd/ lat   Pt edu: HEP update                      HEP:   10/12 Exercises  - Seated Thoracic Lumbar Extension with Pectoralis Stretch  - 2-3 x daily - 7 x weekly - 10 reps  - Seated Thoracic Lumbar Extension  - 2-3 x daily - 7 x weekly - 10 reps  - Standing Thoracic Open Book at [address]  - 2-3 x daily - 7 x weekly - 10 reps  - Plank with Thoracic Rotation on Counter  - 2-3 x daily - 7 x weekly - 10 reps    Exercises  - Cat Cow  - 1 x daily - 7 x weekly - 20 reps  - Child's Pose Stretch  - 1 x daily - 7 x weekly - 10 reps - 10 sec hold  - Child's Pose with Sidebending  - 1 x daily - 7 x weekly - 10 reps - 10 sec  hold    Charges: 2 MT, 1 TE       Total Timed Treatment: 45 min  Total Treatment Time: 45 min

## 2023-10-18 ENCOUNTER — OFFICE VISIT (OUTPATIENT)
Dept: PHYSICAL THERAPY | Facility: HOSPITAL | Age: 43
End: 2023-10-18
Attending: FAMILY MEDICINE
Payer: COMMERCIAL

## 2023-10-18 PROCEDURE — 97140 MANUAL THERAPY 1/> REGIONS: CPT

## 2023-10-18 PROCEDURE — 97110 THERAPEUTIC EXERCISES: CPT

## 2023-10-25 ENCOUNTER — TELEPHONE (OUTPATIENT)
Dept: PHYSICAL THERAPY | Facility: HOSPITAL | Age: 43
End: 2023-10-25

## 2023-10-25 ENCOUNTER — PATIENT MESSAGE (OUTPATIENT)
Facility: CLINIC | Age: 43
End: 2023-10-25

## 2023-10-25 DIAGNOSIS — G89.29 CHRONIC MIDLINE THORACIC BACK PAIN: ICD-10-CM

## 2023-10-25 DIAGNOSIS — M54.6 CHRONIC MIDLINE THORACIC BACK PAIN: ICD-10-CM

## 2023-10-25 DIAGNOSIS — R61 NIGHT SWEATS: Primary | ICD-10-CM

## 2023-10-25 DIAGNOSIS — R07.89 CHEST WALL PAIN: ICD-10-CM

## 2023-10-26 ENCOUNTER — APPOINTMENT (OUTPATIENT)
Dept: PHYSICAL THERAPY | Facility: HOSPITAL | Age: 43
End: 2023-10-26
Attending: FAMILY MEDICINE
Payer: COMMERCIAL

## 2023-11-01 ENCOUNTER — APPOINTMENT (OUTPATIENT)
Dept: PHYSICAL THERAPY | Facility: HOSPITAL | Age: 43
End: 2023-11-01
Attending: FAMILY MEDICINE
Payer: COMMERCIAL

## 2023-11-06 ENCOUNTER — HOSPITAL ENCOUNTER (OUTPATIENT)
Dept: CT IMAGING | Age: 43
Discharge: HOME | End: 2023-11-06
Attending: FAMILY MEDICINE
Payer: COMMERCIAL

## 2023-11-06 DIAGNOSIS — G89.29 CHRONIC BILATERAL THORACIC BACK PAIN: Primary | ICD-10-CM

## 2023-11-06 DIAGNOSIS — M54.6 CHRONIC BILATERAL THORACIC BACK PAIN: Primary | ICD-10-CM

## 2023-11-06 DIAGNOSIS — R61 NIGHT SWEATS: ICD-10-CM

## 2023-11-06 DIAGNOSIS — R07.89 CHEST WALL PAIN: ICD-10-CM

## 2023-11-06 LAB
CREAT BLD-MCNC: 1.1 MG/DL
EGFRCR SERPLBLD CKD-EPI 2021: 85 ML/MIN/1.73M2 (ref 60–?)

## 2023-11-06 PROCEDURE — 71260 CT THORAX DX C+: CPT | Performed by: FAMILY MEDICINE

## 2023-11-06 PROCEDURE — 82565 ASSAY OF CREATININE: CPT

## 2023-11-07 NOTE — TELEPHONE ENCOUNTER
From: Dayana Sheffield  To: Miguel Franconia  Sent: 10/25/2023 10:48 AM CDT  Subject: Follow up on PT and Continuing back pain    Hi Dr Silvia Guaman,    It took a little while but I started PT. [place] had a few visits now and just had a discussion with Maco Rene (my physical therapist) and she has concerns that this pain is not muscular/skeletal. She recommended to place the PT on hold. At least until after a follow up visit with you to discuss the continuing pain, next steps, and/ or additional testing. I believe she will be messaging you too. I just scheduled an appointment, but that is at the end of January.  Is there anyway to get a follow up soon?     Aubrey Lynn

## 2023-11-08 ENCOUNTER — TELEPHONE (OUTPATIENT)
Dept: PHYSICAL MEDICINE AND REHAB | Facility: CLINIC | Age: 43
End: 2023-11-08

## 2023-11-08 NOTE — TELEPHONE ENCOUNTER
Spoke with patient and scheduled a new patient sooner appointment with Eda Ahmadi for [place] [id number] office on 12/13/23. Patient was appreciative and was also added to the wait list.    Nothing further needed at this time.

## 2023-11-08 NOTE — TELEPHONE ENCOUNTER
MEHREEN Emmanuel I reached out to [facility] office and was transfer to [facility]. She will reach out to pt.  Pt was given a appt for 12/12/2023    Future Appointments   Date Time Provider Durga Dobbins   12/13/2023  2:00 PM Adebayo Fish MD [facility] [address]

## 2023-11-09 ENCOUNTER — APPOINTMENT (OUTPATIENT)
Dept: PHYSICAL THERAPY | Facility: HOSPITAL | Age: 43
End: 2023-11-09
Attending: FAMILY MEDICINE
Payer: COMMERCIAL

## 2023-11-20 NOTE — TELEPHONE ENCOUNTER
Has continued to have night sweats every night, which is around the time he notices his back pain set in about 6-7 hours into his sleep. Notices the pain across the middle portion of his back, and it more concentrated on his left side with wrapping around to his side. Notices the front rib pain during the day, but it is more pronounced when he sits for awhile. Will take Advil before bed at time, but not nightly. Given symptoms still present despite negative CT chest will get an MRI With and without contrast. Order placed and he will call to schedule soon.

## 2023-11-28 ENCOUNTER — HOSPITAL ENCOUNTER (OUTPATIENT)
Dept: MRI IMAGING | Age: 43
Discharge: HOME | End: 2023-11-28
Attending: FAMILY MEDICINE
Payer: COMMERCIAL

## 2023-11-28 DIAGNOSIS — G89.29 CHRONIC MIDLINE THORACIC BACK PAIN: ICD-10-CM

## 2023-11-28 DIAGNOSIS — R61 NIGHT SWEATS: ICD-10-CM

## 2023-11-28 DIAGNOSIS — M54.6 CHRONIC MIDLINE THORACIC BACK PAIN: ICD-10-CM

## 2023-11-28 PROCEDURE — 72157 MRI CHEST SPINE W/O & W/DYE: CPT | Performed by: FAMILY MEDICINE

## 2023-11-28 PROCEDURE — A9575 INJ GADOTERATE MEGLUMI 0.1ML: HCPCS | Performed by: FAMILY MEDICINE

## 2023-11-28 RX ORDER — GADOTERATE MEGLUMINE 376.9 MG/ML
15 INJECTION INTRAVENOUS
Status: COMPLETED | OUTPATIENT
Start: 2023-11-28 | End: 2023-11-28

## 2023-11-28 RX ADMIN — GADOTERATE MEGLUMINE 15 ML: 376.9 INJECTION INTRAVENOUS at 10:25:00

## 2023-12-13 ENCOUNTER — OFFICE VISIT (OUTPATIENT)
Dept: PHYSICAL MEDICINE AND REHAB | Facility: CLINIC | Age: 43
End: 2023-12-13
Payer: COMMERCIAL

## 2023-12-13 VITALS — BODY MASS INDEX: 22 KG/M2 | HEIGHT: 70 IN

## 2023-12-13 DIAGNOSIS — M54.6 CHRONIC BILATERAL THORACIC BACK PAIN: ICD-10-CM

## 2023-12-13 DIAGNOSIS — G89.29 CHRONIC BILATERAL THORACIC BACK PAIN: ICD-10-CM

## 2023-12-13 DIAGNOSIS — R07.81 RIB PAIN ON LEFT SIDE: Primary | ICD-10-CM

## 2023-12-13 DIAGNOSIS — R29.898 SHOULDER WEAKNESS: ICD-10-CM

## 2023-12-13 PROCEDURE — 99204 OFFICE O/P NEW MOD 45 MIN: CPT | Performed by: PHYSICAL MEDICINE & REHABILITATION

## 2023-12-13 NOTE — PROGRESS NOTES
Low Back Pain H & P    Chief Complaint:    Chief Complaint   Patient presents with    Back Pain     New handed patient c/o BL thoracic pain that started in 7/2023, denies injury just woke up one day and felt an intense pain. Pain has continued to wake him up throughout the night. Completed xrays 8/25 and thoracic MRI 11/28/23. Did 2 sessions of PT and was referred back to PCP, therapist did not think based on sx this was musculoskeletal. Continues to do stretching at home and takes hot showers which provide temporary relief. Occasional N/T when in certain positions. LOP 5/10       HPI:  Epifanio Kat is a 39year old year old right handed male without a prior history of low back pain. The pain started 5 months ago when the patient awoke at 4 am with bilateral mid back pain and right lower rib pain. The pain did not resolve after a couple of days and therefore he saw Dr. Kimmie Huerta who did x-rays and had him get x-rays and start PT. He also developed night sweats. He did 2 sessions of the PT which did not help and he was told to follow up with Dr. Kimmie Huerta. He had a CT scan of the chest and a MRI of the thoracic spine which were all negative for pathology. He was them referred to me. The pain will wake him up after he has been sleeping for 6 hours. Description of the Pain  The pain is located in the bilateral low back. The pain radiates to the left rib cage. The pain at its best is 3/10. The pain at its worst is 7/10. The pain is currently  4/10. The pain is described as a(n) aching and dull sensation. The left rib cage can be a twinge. The pain is worse when bending, sitting, in the morning, and laying on the left side . The pain is better standing, walking, and being distracted . There is no numbness. There is no tingling in the legs. He has bilateral medial forearm and hand tingling when he is sleeping on his back. There is not weakness in both legs and both arms and hands.     Past Medical History   History reviewed. No pertinent past medical history. Past Surgical History   Past Surgical History:   Procedure Laterality Date    OTHER  1979    Pyloric stenosis        Family History   Family History   Problem Relation Age of Onset    No Known Problems Mother     No Known Problems Father     No Known Problems Sister     Dementia Maternal Grandmother         Alzheimer's    Dementia Paternal Grandmother         Lewy Body     Colon Cancer Paternal Grandfather     Other (Parkinson's) Maternal Uncle        Social History   Social History     Socioeconomic History    Marital status:      Spouse name: Not on file    Number of children: Not on file    Years of education: Not on file    Highest education level: Not on file   Occupational History    Not on file   Tobacco Use    Smoking status: Former    Smokeless tobacco: Never   Vaping Use    Vaping Use: Never used   Substance and Sexual Activity    Alcohol use: Yes     Comment: [employer] a night     Drug use: No    Sexual activity: Yes     Partners: Female     Birth control/protection: I.U.D. Other Topics Concern    Caffeine Concern Not Asked    Exercise Not Asked    Seat Belt Not Asked    Special Diet Not Asked    Stress Concern Not Asked    Weight Concern Not Asked   Social History Narrative    Not on file     Social Determinants of Health     Financial Resource Strain: Not on file   Food Insecurity: Not on file   Transportation Needs: Not on file   Physical Activity: Not on file   Stress: Not on file   Social Connections: Not on file   Housing Stability: Not on file       Review of Systems  Review of Systems   Constitutional: Negative. Night sweats   HENT: Negative. Eyes: Negative. Respiratory: Negative. Cardiovascular: Negative. Gastrointestinal: Negative. Genitourinary: Negative. Musculoskeletal: Negative. Skin: Negative. Neurological: Negative. Endo/Heme/Allergies: Negative.     Psychiatric/Behavioral: The patient has insomnia. Insomnia due to the pain. All other systems reviewed and are negative. PE:  The patient does appear in his stated age in no distress. The patient is well groomed. Psychiatric:  The patient is alert and oriented x 3. The patient has a normal affect and mood. Respiratory:  No acute respiratory distress. Patient does not have a cough. HEENT:  Extraocular muscles are intact. There is no kern icterus. Pupils are equal, round, and reactive to light. No redness or discharge bilaterally. Skin:  There are no rashes or lesions. Vitals: There were no vitals filed for this visit. Gait:    Gait: Normal gait   Sit to Stand: no difficulty   RIGHT Walking on Toes: no difficulty   LEFT Walking on Toes: no difficulty   RIGHT Walking on Heels: no difficulty   LEFT Walking on Heels: no difficulty     Cervical Spine:    Posture: mild-moderate chin forward superiorly rotated protracted shoulder posture. Shoulders: Level   Head: In neutral   Spinous Processes Palpations: Non-tender for all Spinous Processes   Z-Joints Palpations: Non-tender for all Z-joints   Muscular Palpations: Non-tender to palpation. Cervical Flexion: 45 degrees Painless   Cervical Extension: 40 degrees Gives the patient pain in the thoracic spine   RIGHT rotation: 60 degrees Painless   LEFT rotation: 70 degrees Painless     Thoracic and Lumbar Spine:    Scoliosis: No scoliosis present   Lumbar Flexion: 80 degrees Painless   Lumbar Extension: 40 degrees Painless     Ribs:  Tender to palpation at the inferior border of the left 11th rib.     Thoracic and Lumbar Spine Palpation:    Spinous Processes: Non-tender for all Spinous Processes   Z-joints: Non-tender for all Z-joints   SIJ: Non-tender for bilateral SIJ   Piriformis Muscle: Non-tender bilateral Piriformis muscles   Greater Trochanteric Bursa: Non-tender for bilateral Greater Trochanteric Bursa     Vascular upper extremity:   Right radial pulses: 2+   Left radial pulses: 2+     Vascular lower extremity:   Dorsalis pedis pulse-RIGHT 2+   Dorsalis pedis pulse-LEFT 2+   Tibialis posterior pulse-RIGHT 2+   Tibialis posterior pulse-LEFT 2+      Neurological Upper Extremity:    Light Touch: Intact in Bilateral upper extremities. Pin Prick: Not tested. UE Muscle Strength: All Upper Extremity strength measurements 5/5 except:  Shoulder external rotators Left: 4-/5  Serratus anterior Left: 4/5  Rhomboid Left: 4/5  Left Middle Trapezius: 4/5   Reflexes: 2+ In the bilateral upper extremities. Rouse's sign Right: Negative   Rouse's sigh Left: Negative     Neurological Lower Extremity:    Light Touch Sensation: Intact in bilateral Lower Extremities   LE Muscle Strength: All LE strength measurements 5/5   RIGHT plantar reflexes: downward response   LEFT plantar reflexes: downward response   Reflexes: 2+ in bilateral lower extremities     Hip: Hips are stable. RIGHT hip ROM normal   LEFT hip ROM normal   RIGHT hip flexion Negative pain   LEFT hip flexion Negative pain   RIGHT hip LUCILA test Negative for pain   LEFT hip LUCILA test Negative for pain   RIGHT hip internal rotation Negative for pain   LEFT hip internal rotation Negative for pain   RIGHT hip piriformis stretch test Negative for pain   LEFT hip piriformis stretch test Negative for pain     Neural Tension Tests Lumbar Spine:  Sitting straight leg raise-RIGHT Negative for pain   Sitting straight leg raise-LEFT Negative for pain   Supine straight leg raise-RIGHT Negative for pain   Supine straight leg raise-LEFT Negative for pain   Slump test-RIGHT Negative for pain   Slump test-LEFT Negative for pain   Bilateral hamstrings are moderately tight. Lymph Nodes:   Inguinal Lymph Nodes Absent     Lymph nodes:  Non palpable submandibular, supraclavicular, and posterior cervical    Radiology Imaging:  I reviewed with the patient his MRI of the thoracic spine from 11/28/2023. Assessment  1.  Rib pain on left side    2. Chronic bilateral thoracic back pain    3. Shoulder weakness on left side      Plan  He will get into the PT for the thoracic spine and the ribs with Tejas Ibrahim or Janie Chapman. He will follow up in 3 months or sooner if needed. The patient understands and agrees with the stated plan. Sidra Hendricks MD  12/13/2023

## 2023-12-13 NOTE — PATIENT INSTRUCTIONS
Plan  He will get into the PT for the thoracic spine and the ribs with Sean Mcdaniel or Mayela Acosta. He will follow up in 3 months or sooner if needed.

## 2023-12-15 ENCOUNTER — OFFICE VISIT (OUTPATIENT)
Dept: PHYSICAL THERAPY | Facility: HOSPITAL | Age: 43
End: 2023-12-15
Attending: PHYSICAL MEDICINE & REHABILITATION
Payer: COMMERCIAL

## 2023-12-15 DIAGNOSIS — R07.81 RIB PAIN ON LEFT SIDE: Primary | ICD-10-CM

## 2023-12-15 DIAGNOSIS — M54.6 CHRONIC BILATERAL THORACIC BACK PAIN: ICD-10-CM

## 2023-12-15 DIAGNOSIS — G89.29 CHRONIC BILATERAL THORACIC BACK PAIN: ICD-10-CM

## 2023-12-15 DIAGNOSIS — R29.898 SHOULDER WEAKNESS: ICD-10-CM

## 2023-12-15 PROCEDURE — 97161 PT EVAL LOW COMPLEX 20 MIN: CPT | Performed by: PHYSICAL THERAPIST

## 2023-12-15 PROCEDURE — 97112 NEUROMUSCULAR REEDUCATION: CPT | Performed by: PHYSICAL THERAPIST

## 2023-12-15 PROCEDURE — 97110 THERAPEUTIC EXERCISES: CPT | Performed by: PHYSICAL THERAPIST

## 2023-12-21 ENCOUNTER — OFFICE VISIT (OUTPATIENT)
Dept: PHYSICAL THERAPY | Facility: HOSPITAL | Age: 43
End: 2023-12-21
Attending: PHYSICAL MEDICINE & REHABILITATION
Payer: COMMERCIAL

## 2023-12-21 PROCEDURE — 97530 THERAPEUTIC ACTIVITIES: CPT | Performed by: PHYSICAL THERAPIST

## 2023-12-21 PROCEDURE — 97112 NEUROMUSCULAR REEDUCATION: CPT | Performed by: PHYSICAL THERAPIST

## 2023-12-21 PROCEDURE — 97140 MANUAL THERAPY 1/> REGIONS: CPT | Performed by: PHYSICAL THERAPIST

## 2023-12-21 NOTE — PROGRESS NOTES
2023  Dx:  Rib pain on left side (R07.81)  Chronic bilateral thoracic back pain (M54.6,G89.29)  Shoulder weakness (R29.898)             Authorized # of Visits:  12 visits on the POC          Next MD visit: none   Fall Risk: standard         Precautions:  general  Medication Changes since last visit?: No    Subjective: State the rib taping really helped and he was able to sleep better. Reports he wore the tape for 3 days and then took it off. Didn't notice any skin irritation from the tape. .  States he is getting over a cold. Tested (-) for Covid. Pain Ratin-3/10 VAS at night only    Objective:      12/15/2023  Visit #   1 2023  Visit #2   Manual Therapy  Rib mobilization L in R SL    STM intercostal L      Therapeutic Exercise Education on rib mechanics and NIKKI positioning    Therapeutic Activity  Educated on tape precautions   Neuromuscular Education Rib taping Rib taping to decrease rib flare     TNE Education     HEP           Assessment: No adverse effects to treatment. Focused on manual therapy to the L lower rib cage secondary to the patient's cold. The pt was re-taped (no skin irritation) and reported pain relief from the session. Goals: The pt was educated on the plan of care, purpose and individual goals for therapy, precautions for therapy. All questions were answered. 1.  The pt will be independent in their HEP. 2.  Centralization of symptoms to the cervical spine. 3.  The pt will be able to complete a modified workout program.  4.  The pt will be able to sleep through the night without waking up from pain. 5.  The pt will report a 75% reduction in symptoms. Frequency/Duration: Patient will be seen for 1-2 x/week or a total of 12 visits over a 90 day period. Treatment will include: Manual Therapy; Therapeutic Exercises; Neuromuscular Re-education; Therapeutic Activity;  Patient education; Home exercise program instruction; TNE Education, Modalities as needed. Education or treatment limitation: None  Rehab Potential: good    Certification From: 99/88/7822      To: 3/14/2024            Charges: Shayla Lopez (25), NM1 (10), TA1(8)       Total Timed Treatment: 46 min  Total Treatment Time: 46 min        FOR REFERENCE ONLY  CERVICAL SPINE EVALUATION:   Referring Physician: Teresa Trevizo MD    Diagnosis:  Rib pain on left side (R07.81)  Chronic bilateral thoracic back pain (M54.6,G89.29)  Shoulder weakness (M25.901)    Date of Service: 12/15/2023     Date of Onset: 6 months ago        SUBJECTIVE   PATIENT SUMMARY:  Rah Grace is a 39year old y/o male who presents to therapy today with complaints of pain across the mid back. Notices a shot on the front of the L ribs - pain is more persistent in that area and he will notice that when he is sitting. States he had lift/move objects. Feels more pain after 6 hours of sleep. Can't sleep on his L side. When he wakes up and he is able to get back to sleep. States he doesn't sleep as deeply after that. Stopped working out when [employer] shut down his gym. Kids play hockey and doesn't have time to work out. 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 States he is right handed. History of current condition:wok up at 4 am with pain across his back. Insidious onset. Would wake up in the morning. Fabby Whiting was (-) and then started PT. Has night sweats - not soaking though his sheets. States that is a new thing. Previous therapist stated he had a L lower rib flare and more tightness in the L rib cage. Reports after the second visit he was sent back to Dr. Feliciano Rivero. Did a CAT scan and MRI which showed some minor findings. US in [place]. Pain rating:   3-4/10 VAS    Current functional limitations include unable to sleep through the night. States he is very tired because not sleeping through the night.     Greg Smith describes prior level of function independent in all activities. Pt goals include decrease night pain so he has sleep through the night. Past medical history was reviewed with Greg Smith. Significant findings include      No past medical history on file. Past Surgical History:   Procedure Laterality Date    OTHER  1979    Pyloric stenosis      Are you being hurt, frightened, demeaned, or taken advantage of by anyone at your home or in your life? No      Have you recently had thoughts of hurting yourself? No    Have you tried to hurt yourself in the past?  No    ASSESSMENT   Armando Hodges presents back to PT after further medical workout. He displays a L lower rib flare with a bilateral BC position and a neutral pelvis position. He also displays decreased PME expansion and responded well to PME expansion techniques and taping at the first visit. He will benefit from skilled PT to return to his PLOF.     Precautions: None       OBJECTIVE:   Observation/Posture: flattened thoracic kyphosis with elongated posture with FHP    Cervical AROM:  Pain (+/-)   Flexion 40    Extension 45    R Sidebend 20    L Sidebend 35    R Rotation 70    L Rotation 70    Protrusion WFL    Retraction Decreased 25%          NIKKI Testing:    NIKKI Testing R L   Cervical Axial Rotation - -   Apical Expansion decreased WFL   Adduction Drop Test - -   Extension Drop Test NT nT   SLR 75 75   Trunk Rotation NT NT   Posterior Mediastinum Expansion Test decreased decreased   Standing Reach Test - -   Elevated and ER Anterior Rib yes yes       Shoulder AROM:      R    L   Flex 165       165   Abd 170 170   ER Hand behind head Hand behind head   IR Hand to waist Hand to waist   Hort abd 20 degrees 20 degrees       Strength UE:   5/5 MMT Scale   R  L   Shoulder flex  5     5   Shoulder ext NT NT   Abduction (C5) 5 5   ER 4+ 4+   IR 5 5   Biceps (C6) 5 5   Wrist ext (C6) 5 5   Triceps (C7) 5 5   Wrist flex (C7) 5 5   EPL (C8)  5 5   Interossei (T1) 5 5     Strength Scapular: 5/5 MMT Scale   R L   Upper trap (C4) 5 5   Rhomboids 4- 4-   Mid trap 4- 4-   Lower trap 4- 4-   Serratus 4- 4-     Flexibility:   R L   Upper trap long long   Pec Major shoirt short   Pec Minor short short   Lats short WFL     Outcome Surverys  Neck Disability Index Score  No data recorded      Palpation: TTP of the L anterior lower rib cage       Goals: The pt was educated on the plan of care, purpose and individual goals for therapy, precautions for therapy. All questions were answered. 1.  The pt will be independent in their HEP. 2.  Centralization of symptoms to the cervical spine. 3.  The pt will be able to complete a modified workout program.  4.  The pt will be able to sleep through the night without waking up from pain. 5.  The pt will report a 75% reduction in symptoms. Frequency/Duration: Patient will be seen for 1-2 x/week or a total of 12 visits over a 90 day period. Treatment will include: Manual Therapy; Therapeutic Exercises; Neuromuscular Re-education; Therapeutic Activity; Patient education; Home exercise program instruction; TNE Education, Modalities as needed.     Education or treatment limitation: None  Rehab Potential: good    Certification From: 21/42/4226      To: 3/14/2024

## 2023-12-28 ENCOUNTER — OFFICE VISIT (OUTPATIENT)
Dept: PHYSICAL THERAPY | Facility: HOSPITAL | Age: 43
End: 2023-12-28
Attending: PHYSICAL MEDICINE & REHABILITATION
Payer: COMMERCIAL

## 2023-12-28 PROCEDURE — 97530 THERAPEUTIC ACTIVITIES: CPT | Performed by: PHYSICAL THERAPIST

## 2023-12-28 PROCEDURE — 97140 MANUAL THERAPY 1/> REGIONS: CPT | Performed by: PHYSICAL THERAPIST

## 2023-12-28 PROCEDURE — 97112 NEUROMUSCULAR REEDUCATION: CPT | Performed by: PHYSICAL THERAPIST

## 2023-12-28 NOTE — PROGRESS NOTES
2023    Dx:  Rib pain on left side (R07.81)  Chronic bilateral thoracic back pain (M54.6,G89.29)  Shoulder weakness (R29.898)             Authorized # of Visits:  12 visits on the POC          Next MD visit: none   Fall Risk: standard         Precautions:  general  Medication Changes since last visit?: No    Subjective: State he is feeling a little worse. Reports he has been coughing for 1.5 weeks. Reports he wasn't to sore after the last treatment. States the tapes helps but has only been taping intermittently for his skin. Did have some pain in sitting while working today. Doesn't think it is his HEP that is making him sore. Pain Ratin-3/10 VAS at night only    Objective:      12/15/2023  Visit #   1 2023  Visit #2 2023  Visit #3   Manual Therapy  Rib mobilization L in R SL    STM intercostal L    Rib mobilization L in R SL    STM intercostal L    Therapeutic Exercise Education on rib mechanics and NIKKI positioning     Therapeutic Activity  Educated on tape precautions Discussion of rib belt/resources given   Neuromuscular Education Rib taping Rib taping to decrease rib flare   Rib taping to decrease rib flare   TNE Education      HEP            Assessment: No adverse effects to treatment. Focused on manual therapy to the L lower rib cage secondary to the patient's continued cold. Educated the patient on use of a rib belt and given resources to obtain. The pt was re-taped (no skin irritation) and reported pain relief from the session. Goals: The pt was educated on the plan of care, purpose and individual goals for therapy, precautions for therapy. All questions were answered. 1.  The pt will be independent in their HEP. 2.  Centralization of symptoms to the cervical spine. 3.  The pt will be able to complete a modified workout program.  4.  The pt will be able to sleep through the night without waking up from pain.   5.  The pt will report a 75% reduction in symptoms. Frequency/Duration: Patient will be seen for 1-2 x/week or a total of 12 visits over a 90 day period. Treatment will include: Manual Therapy; Therapeutic Exercises; Neuromuscular Re-education; Therapeutic Activity; Patient education; Home exercise program instruction; TNE Education, Modalities as needed. Education or treatment limitation: None  Rehab Potential: good    Certification From: 52/44/7855      To: 3/14/2024            Charges: Juliet Anger (25), NM1 (10), TA1(8)       Total Timed Treatment: 46 min  Total Treatment Time: 46 min        FOR REFERENCE ONLY  CERVICAL SPINE EVALUATION:   Referring Physician: Abi Lynn MD    Diagnosis:  Rib pain on left side (R07.81)  Chronic bilateral thoracic back pain (M54.6,G89.29)  Shoulder weakness (R92.720)    Date of Service: 12/15/2023     Date of Onset: 6 months ago        SUBJECTIVE   PATIENT SUMMARY:  Britt Amato is a 39year old y/o male who presents to therapy today with complaints of pain across the mid back. Notices a shot on the front of the L ribs - pain is more persistent in that area and he will notice that when he is sitting. States he had lift/move objects. Feels more pain after 6 hours of sleep. Can't sleep on his L side. When he wakes up and he is able to get back to sleep. States he doesn't sleep as deeply after that. Stopped working out when [employer] shut down his gym. Kids play hockey and doesn't have time to work out. 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 States he is right handed. History of current condition:wok up at 4 am with pain across his back. Insidious onset. Would wake up in the morning. Josh Herrera was (-) and then started PT. Has night sweats - not soaking though his sheets. States that is a new thing. Previous therapist stated he had a L lower rib flare and more tightness in the L rib cage.   Reports after the second visit he was sent back to Dr. Az Altamirano. Did a CAT scan and MRI which showed some minor findings. US in [place]. Pain rating:   3-4/10 VAS    Current functional limitations include unable to sleep through the night. States he is very tired because not sleeping through the night. Grace Hubbard describes prior level of function independent in all activities. Pt goals include decrease night pain so he has sleep through the night. Past medical history was reviewed with Grace Hubbard. Significant findings include      No past medical history on file. Past Surgical History:   Procedure Laterality Date    OTHER  1979    Pyloric stenosis      Are you being hurt, frightened, demeaned, or taken advantage of by anyone at your home or in your life? No      Have you recently had thoughts of hurting yourself? No    Have you tried to hurt yourself in the past?  No    ASSESSMENT   Mario Snyder presents back to PT after further medical workout. He displays a L lower rib flare with a bilateral BC position and a neutral pelvis position. He also displays decreased PME expansion and responded well to PME expansion techniques and taping at the first visit. He will benefit from skilled PT to return to his PLOF.     Precautions: None       OBJECTIVE:   Observation/Posture: flattened thoracic kyphosis with elongated posture with FHP    Cervical AROM:  Pain (+/-)   Flexion 40    Extension 45    R Sidebend 20    L Sidebend 35    R Rotation 70    L Rotation 70    Protrusion WFL    Retraction Decreased 25%          NIKKI Testing:    NIKKI Testing R L   Cervical Axial Rotation - -   Apical Expansion decreased WFL   Adduction Drop Test - -   Extension Drop Test NT nT   SLR 75 75   Trunk Rotation NT NT   Posterior Mediastinum Expansion Test decreased decreased   Standing Reach Test - -   Elevated and ER Anterior Rib yes yes       Shoulder AROM:      R    L   Flex 165       165   Abd 170 170   ER Hand behind head Hand behind head   IR Hand to waist Hand to waist   Hort abd 20 degrees 20 degrees       Strength UE:   5/5 MMT Scale   R  L   Shoulder flex  5     5   Shoulder ext NT NT   Abduction (C5) 5 5   ER 4+ 4+   IR 5 5   Biceps (C6) 5 5   Wrist ext (C6) 5 5   Triceps (C7) 5 5   Wrist flex (C7) 5 5   EPL (C8)  5 5   Interossei (T1) 5 5     Strength Scapular: 5/5 MMT Scale   R L   Upper trap (C4) 5 5   Rhomboids 4- 4-   Mid trap 4- 4-   Lower trap 4- 4-   Serratus 4- 4-     Flexibility:   R L   Upper trap long long   Pec Major shoirt short   Pec Minor short short   Lats short WFL     Outcome Surverys  Neck Disability Index Score  No data recorded      Palpation: TTP of the L anterior lower rib cage       Goals: The pt was educated on the plan of care, purpose and individual goals for therapy, precautions for therapy. All questions were answered. 1.  The pt will be independent in their HEP. 2.  Centralization of symptoms to the cervical spine. 3.  The pt will be able to complete a modified workout program.  4.  The pt will be able to sleep through the night without waking up from pain. 5.  The pt will report a 75% reduction in symptoms. Frequency/Duration: Patient will be seen for 1-2 x/week or a total of 12 visits over a 90 day period. Treatment will include: Manual Therapy; Therapeutic Exercises; Neuromuscular Re-education; Therapeutic Activity; Patient education; Home exercise program instruction; TNE Education, Modalities as needed.     Education or treatment limitation: None  Rehab Potential: good    Certification From: 16/50/7108      To: 3/14/2024

## 2024-01-15 ENCOUNTER — OFFICE VISIT (OUTPATIENT)
Dept: PHYSICAL THERAPY | Facility: HOSPITAL | Age: 44
End: 2024-01-15
Attending: PHYSICAL MEDICINE & REHABILITATION
Payer: COMMERCIAL

## 2024-01-15 PROCEDURE — 97140 MANUAL THERAPY 1/> REGIONS: CPT | Performed by: PHYSICAL THERAPIST

## 2024-01-15 PROCEDURE — 97112 NEUROMUSCULAR REEDUCATION: CPT | Performed by: PHYSICAL THERAPIST

## 2024-01-15 NOTE — PROGRESS NOTES
1/15/2024  Dx:  Rib pain on left side (R07.81)  Chronic bilateral thoracic back pain (M54.6,G89.29)  Shoulder weakness (R29.898)             Authorized # of Visits:  12 visits on the POC          Next MD visit: none   Fall Risk: standard         Precautions:  general  Medication Changes since last visit?: No    Subjective: States his coughing as resolved for the most part.  States he had more pain along the rib  and in the spine the week after he had therapy.  Reports he got his rib belt which has helped considerably.  States he takes that of when he sleeps.  States he has been waking up in the morning with less pain.    Pain Ratin-3/10 VAS at night only    Objective:      2023  Visit #2 2023  Visit #3 1/15/2024  Visit #4   Manual Therapy Rib mobilization L in R SL    STM intercostal L    Rib mobilization L in R SL    STM intercostal L  ib mobilization L in R SL    STM intercostal L    Therapeutic Exercise      Therapeutic Activity Educated on tape precautions Discussion of rib belt/resources given    Neuromuscular Education Rib taping to decrease rib flare   Rib taping to decrease rib flare Modified all 4 belly lift    Modified all 4 belly lift with arm lift    Standing supported wall reach    Modified respiratory crawl    Serratus breathing   TNE Education      HEP   Modified all 4 belly lift with arm lift    Standing supported wall reach    Modified respiratory crawl    Serratus breathing         Assessment: Ridge Cuevas has completed 4 visits of PT and has progressed well.  He has less pain in the morning and less pain during the day when wearing his rib belt.  He continues to display TTP of the L lateral rib cage with and needs further ability to retract and IR the rib cage.  Worked on more activities to improve PME expansion with a pelvic floor component and inhibit the hamstrings this date.  Will progress to L serratus anterior and L hamstring if he text neutral at the next visit.  Recommend  continued PT 1-2 times per week for up to 10 visits and then re-assess at that time.  Will request 5 more visits from insurance.    Goals:     The pt was educated on the plan of care, purpose and individual goals for therapy, precautions for therapy.  All questions were answered.     1.  The pt will be independent in their HEP.  2.  Centralization of symptoms to the cervical spine.  3.  The pt will be able to complete a modified workout program.  4.  The pt will be able to sleep through the night without waking up from pain.  5.  The pt will report a 75% reduction in symptoms.      Frequency/Duration: Patient will be seen for 1-2 x/week or a total of 12 visits over a 90 day period. Treatment will include: Manual Therapy; Therapeutic Exercises; Neuromuscular Re-education; Therapeutic Activity; Patient education; Home exercise program instruction; TNE Education, Modalities as needed.    Education or treatment limitation: None  Rehab Potential: good    Certification From: 12/15/2023      To: 3/14/2024        Charges: Man2 (25), NM 2 (30)    Total Timed Treatment: 50 min  Total Treatment Time: 50 min        FOR REFERENCE ONLY  CERVICAL SPINE EVALUATION:   Referring Physician: Jake Seay MD    Diagnosis:  Rib pain on left side (R07.81)  Chronic bilateral thoracic back pain (M54.6,G89.29)  Shoulder weakness (R29.898)    Date of Service: 12/15/2023     Date of Onset: 6 months ago        SUBJECTIVE   PATIENT SUMMARY:  Ridge Ambrocio is a 43 year old y/o male who presents to therapy today with complaints of pain across the mid back.  Notices a shot on the front of the L ribs - pain is more persistent in that area and he will notice that when he is sitting.  States he had lift/move objects.      Feels more pain after 6 hours of sleep.  Can't sleep on his L side.  When he wakes up and he is able to get back to sleep.  States he doesn't sleep as deeply after that.    Stopped working out when Covid shut down his gym.   Kids play hockey and doesn't have time to work out.      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    Weakness in the L shoulder and rib pain.  States he is right handed.   History of current condition:wok up at 4 am with pain across his back.  Insidious onset.  Would wake up in the morning.  X-way was (-) and then started PT.      Has night sweats - not soaking though his sheets.  States that is a new thing.      Previous therapist stated he had a L lower rib flare and more tightness in the L rib cage.  Reports after the second visit he was sent back to Dr. Madden.  Did a CAT scan and MRI which showed some minor findings.  US in [facility].      Pain rating:   3-4/10 VAS    Current functional limitations include unable to sleep through the night.  States he is very tired because not sleeping through the night.    Ridge describes prior level of function independent in all activities. Pt goals include decrease night pain so he has sleep through the night.  Past medical history was reviewed with Ridge. Significant findings include      No past medical history on file.  Past Surgical History:   Procedure Laterality Date    OTHER  1979    Pyloric stenosis      Are you being hurt, frightened, demeaned, or taken advantage of by anyone at your home or in your life?  No      Have you recently had thoughts of hurting yourself?  No    Have you tried to hurt yourself in the past?  No    ASSESSMENT   Ridge Ambrocio presents back to PT after further medical workout.  He displays a L lower rib flare with a bilateral BC position and a neutral pelvis position.  He also displays decreased PME expansion and responded well to PME expansion techniques and taping at the first visit.  He will benefit from skilled PT to return to his PLOF.    Precautions: None       OBJECTIVE:   Observation/Posture: flattened thoracic kyphosis with elongated posture with FHP    Cervical AROM:  Pain (+/-)   Flexion 40    Extension 45    R  Sidebend 20    L Sidebend 35    R Rotation 70    L Rotation 70    Protrusion WFL    Retraction Decreased 25%          NIKKI Testing:    NIKKI Testing R L   Cervical Axial Rotation - -   Apical Expansion decreased WFL   Adduction Drop Test - -   Extension Drop Test NT nT   SLR 75 75   Trunk Rotation NT NT   Posterior Mediastinum Expansion Test decreased decreased   Standing Reach Test - -   Elevated and ER Anterior Rib yes yes       Shoulder AROM:      R    L   Flex 165       165   Abd 170 170   ER Hand behind head Hand behind head   IR Hand to waist Hand to waist   Hort abd 20 degrees 20 degrees       Strength UE:   5/5 MMT Scale   R  L   Shoulder flex  5     5   Shoulder ext NT NT   Abduction (C5) 5 5   ER 4+ 4+   IR 5 5   Biceps (C6) 5 5   Wrist ext (C6) 5 5   Triceps (C7) 5 5   Wrist flex (C7) 5 5   EPL (C8)  5 5   Interossei (T1) 5 5     Strength Scapular: 5/5 MMT Scale   R L   Upper trap (C4) 5 5   Rhomboids 4- 4-   Mid trap 4- 4-   Lower trap 4- 4-   Serratus 4- 4-     Flexibility:   R L   Upper trap long long   Pec Major shoirt short   Pec Minor short short   Lats short WFL     Outcome Surverys  Neck Disability Index Score  No data recorded      Palpation: TTP of the L anterior lower rib cage       Goals:     The pt was educated on the plan of care, purpose and individual goals for therapy, precautions for therapy.  All questions were answered.     1.  The pt will be independent in their HEP.  2.  Centralization of symptoms to the cervical spine.  3.  The pt will be able to complete a modified workout program.  4.  The pt will be able to sleep through the night without waking up from pain.  5.  The pt will report a 75% reduction in symptoms.      Frequency/Duration: Patient will be seen for 1-2 x/week or a total of 12 visits over a 90 day period. Treatment will include: Manual Therapy; Therapeutic Exercises; Neuromuscular Re-education; Therapeutic Activity; Patient education; Home exercise program instruction;  TNE Education, Modalities as needed.    Education or treatment limitation: None  Rehab Potential: good    Certification From: 12/15/2023      To: 3/14/2024

## 2024-01-19 ENCOUNTER — OFFICE VISIT (OUTPATIENT)
Dept: PHYSICAL THERAPY | Facility: HOSPITAL | Age: 44
End: 2024-01-19
Attending: PHYSICAL MEDICINE & REHABILITATION
Payer: COMMERCIAL

## 2024-01-19 PROCEDURE — 97112 NEUROMUSCULAR REEDUCATION: CPT | Performed by: PHYSICAL THERAPIST

## 2024-01-19 NOTE — PROGRESS NOTES
2024    Dx:  Rib pain on left side (R07.81)  Chronic bilateral thoracic back pain (M54.6,G89.29)  Shoulder weakness (R29.898)             Authorized # of Visits:  12 visits on the POC          Next MD visit: none   Fall Risk: standard         Precautions:  general  Medication Changes since last visit?: No    Subjective: States his neck is bothering him and he continues to get sore after manual work on his L rib cage during therapy sessions.  Reports overall his pain is less than when he started therapy.  Sleeping better.    Pain Ratin-3/10 VAS     Objective:      2023  Visit #2 2023  Visit #3 1/15/2024  Visit #4 2024  Visit #5   Manual Therapy Rib mobilization L in R SL    STM intercostal L    Rib mobilization L in R SL    STM intercostal L      Therapeutic Exercise       Therapeutic Activity Educated on tape precautions Discussion of rib belt/resources given     Neuromuscular Education Rib taping to decrease rib flare   Rib taping to decrease rib flare Modified all 4 belly lift    Modified all 4 belly lift with arm lift    Standing supported wall reach    Modified respiratory crawl    Serratus breathing Modified all 4 belly lift    Serratus breathing    Standing wall press    Self sacral splenoid flexion       TNE Education       HEP   Modified all 4 belly lift with arm lift    Standing supported wall reach    Modified respiratory crawl    Serratus breathing Standing wall press    Self sacral splenoid flexion         Assessment: Ridge Cuevas has completed 5 visits of PT and has progressed well.  He continues to display a non-neutral chest wall and cervical spine.  Responded well to self sacral splenoid flexion and given for HEP.  Needs further PME expansion and ability to retract the rib cage.  Less pain overall but continues to need the rib belt.  Did not perform manual therapy this date and will assess the effects of no manual therapy next visit.    The pt was educated on the plan of  care, purpose and individual goals for therapy, precautions for therapy.  All questions were answered.     1.  The pt will be independent in their HEP.  2.  Centralization of symptoms to the cervical spine.  3.  The pt will be able to complete a modified workout program.  4.  The pt will be able to sleep through the night without waking up from pain.  5.  The pt will report a 75% reduction in symptoms.      Frequency/Duration: Patient will be seen for 1-2 x/week or a total of 12 visits over a 90 day period. Treatment will include: Manual Therapy; Therapeutic Exercises; Neuromuscular Re-education; Therapeutic Activity; Patient education; Home exercise program instruction; TNE Education, Modalities as needed.    Education or treatment limitation: None  Rehab Potential: good    Certification From: 12/15/2023      To: 3/14/2024        Charges: NM3 (38)    Total Timed Treatment: 38 min  Total Treatment Time: 38 min        FOR REFERENCE ONLY  CERVICAL SPINE EVALUATION:   Referring Physician: Jake Seay MD    Diagnosis:  Rib pain on left side (R07.81)  Chronic bilateral thoracic back pain (M54.6,G89.29)  Shoulder weakness (R29.898)    Date of Service: 12/15/2023     Date of Onset: 6 months ago        SUBJECTIVE   PATIENT SUMMARY:  Ridge Ambrocio is a 43 year old y/o male who presents to therapy today with complaints of pain across the mid back.  Notices a shot on the front of the L ribs - pain is more persistent in that area and he will notice that when he is sitting.  States he had lift/move objects.      Feels more pain after 6 hours of sleep.  Can't sleep on his L side.  When he wakes up and he is able to get back to sleep.  States he doesn't sleep as deeply after that.    Stopped working out when Covid shut down his gym.  Kids play hockey and doesn't have time to work out.      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    Weakness in the L shoulder and rib pain.  States he is right  handed.   History of current condition:wok up at 4 am with pain across his back.  Insidious onset.  Would wake up in the morning.  X-way was (-) and then started PT.      Has night sweats - not soaking though his sheets.  States that is a new thing.      Previous therapist stated he had a L lower rib flare and more tightness in the L rib cage.  Reports after the second visit he was sent back to Dr. Madden.  Did a CAT scan and MRI which showed some minor findings.  US in [facility].      Pain rating:   3-4/10 VAS    Current functional limitations include unable to sleep through the night.  States he is very tired because not sleeping through the night.    Ridge describes prior level of function independent in all activities. Pt goals include decrease night pain so he has sleep through the night.  Past medical history was reviewed with Ridge. Significant findings include      No past medical history on file.  Past Surgical History:   Procedure Laterality Date    OTHER  1979    Pyloric stenosis      Are you being hurt, frightened, demeaned, or taken advantage of by anyone at your home or in your life?  No      Have you recently had thoughts of hurting yourself?  No    Have you tried to hurt yourself in the past?  No    ASSESSMENT   Ridge Ambrocio presents back to PT after further medical workout.  He displays a L lower rib flare with a bilateral BC position and a neutral pelvis position.  He also displays decreased PME expansion and responded well to PME expansion techniques and taping at the first visit.  He will benefit from skilled PT to return to his PLOF.    Precautions: None       OBJECTIVE:   Observation/Posture: flattened thoracic kyphosis with elongated posture with FHP    Cervical AROM:  Pain (+/-)   Flexion 40    Extension 45    R Sidebend 20    L Sidebend 35    R Rotation 70    L Rotation 70    Protrusion WFL    Retraction Decreased 25%          NIKKI Testing:    NIKKI Testing R L   Cervical Axial Rotation - -   Apical  Expansion decreased WFL   Adduction Drop Test - -   Extension Drop Test NT nT   SLR 75 75   Trunk Rotation NT NT   Posterior Mediastinum Expansion Test decreased decreased   Standing Reach Test - -   Elevated and ER Anterior Rib yes yes       Shoulder AROM:      R    L   Flex 165       165   Abd 170 170   ER Hand behind head Hand behind head   IR Hand to waist Hand to waist   Hort abd 20 degrees 20 degrees       Strength UE:   5/5 MMT Scale   R  L   Shoulder flex  5     5   Shoulder ext NT NT   Abduction (C5) 5 5   ER 4+ 4+   IR 5 5   Biceps (C6) 5 5   Wrist ext (C6) 5 5   Triceps (C7) 5 5   Wrist flex (C7) 5 5   EPL (C8)  5 5   Interossei (T1) 5 5     Strength Scapular: 5/5 MMT Scale   R L   Upper trap (C4) 5 5   Rhomboids 4- 4-   Mid trap 4- 4-   Lower trap 4- 4-   Serratus 4- 4-     Flexibility:   R L   Upper trap long long   Pec Major shoirt short   Pec Minor short short   Lats short WFL     Outcome Surverys  Neck Disability Index Score  No data recorded      Palpation: TTP of the L anterior lower rib cage       Goals:     The pt was educated on the plan of care, purpose and individual goals for therapy, precautions for therapy.  All questions were answered.     1.  The pt will be independent in their HEP.  2.  Centralization of symptoms to the cervical spine.  3.  The pt will be able to complete a modified workout program.  4.  The pt will be able to sleep through the night without waking up from pain.  5.  The pt will report a 75% reduction in symptoms.      Frequency/Duration: Patient will be seen for 1-2 x/week or a total of 12 visits over a 90 day period. Treatment will include: Manual Therapy; Therapeutic Exercises; Neuromuscular Re-education; Therapeutic Activity; Patient education; Home exercise program instruction; TNE Education, Modalities as needed.    Education or treatment limitation: None  Rehab Potential: good    Certification From: 12/15/2023      To: 3/14/2024

## 2024-01-26 ENCOUNTER — OFFICE VISIT (OUTPATIENT)
Dept: PHYSICAL THERAPY | Facility: HOSPITAL | Age: 44
End: 2024-01-26
Attending: PHYSICAL MEDICINE & REHABILITATION
Payer: COMMERCIAL

## 2024-01-26 PROCEDURE — 97110 THERAPEUTIC EXERCISES: CPT | Performed by: PHYSICAL THERAPIST

## 2024-01-26 PROCEDURE — 97112 NEUROMUSCULAR REEDUCATION: CPT | Performed by: PHYSICAL THERAPIST

## 2024-01-26 PROCEDURE — 97140 MANUAL THERAPY 1/> REGIONS: CPT | Performed by: PHYSICAL THERAPIST

## 2024-01-26 NOTE — PROGRESS NOTES
2024    Dx:  Rib pain on left side (R07.81)  Chronic bilateral thoracic back pain (M54.6,G89.29)  Shoulder weakness (R29.898)             Authorized # of Visits:  12 visits on the POC          Next MD visit: none   Fall Risk: standard         Precautions:  general  Medication Changes since last visit?: No    Subjective: States he is feeling better.  States he is feeling better at night.  Reports when he pulls his shoulder blade back towards his spine.    Pain Ratin-3/10 VAS     Objective:      1/15/2024  Visit #4 2024  Visit #5 2024  Visit #6   Manual Therapy   Brachial Chain Manual Techniques  1.  L AIC  2.  Sternal Technique      Manual sacral splenoid flexion    Thoracic spine mobs in prone          Therapeutic Exercise      Therapeutic Activity   Education on tension on the rib belt and to wear for 2 additional weeks secondary to length of time for connective tissue healing   Neuromuscular Education Modified all 4 belly lift    Modified all 4 belly lift with arm lift    Standing supported wall reach    Modified respiratory crawl    Serratus breathing Modified all 4 belly lift    Serratus breathing    Standing wall press    Self sacral splenoid flexion     Self sacral splenoid flexion    TNE Education      HEP Modified all 4 belly lift with arm lift    Standing supported wall reach    Modified respiratory crawl    Serratus breathing Standing wall press    Self sacral splenoid flexion Continue his current HEP    Continue to wear the rib belt x2 weeks         Assessment: Ridge Cuevas has completed 6 visits of PT and has progressed well. Added more manual work to the R posterior thoracic spine secondary to increased symptoms in that area. The pt displayed improved air flow after self sacral splenoid flexion technique.  Advised to continued using the rib belt for 2 additional weeks with focus on upper chest wall expansion while wearing the belt.  The pt was also advised to continue his current  HEP.    GOALS:  1.  The pt will be independent in their HEP.  2.  Centralization of symptoms to the cervical spine.  3.  The pt will be able to complete a modified workout program.  4.  The pt will be able to sleep through the night without waking up from pain.  5.  The pt will report a 75% reduction in symptoms.      Frequency/Duration: Patient will be seen for 1-2 x/week or a total of 12 visits over a 90 day period. Treatment will include: Manual Therapy; Therapeutic Exercises; Neuromuscular Re-education; Therapeutic Activity; Patient education; Home exercise program instruction; TNE Education, Modalities as needed.    Education or treatment limitation: None  Rehab Potential: good    Certification From: 12/15/2023      To: 3/14/2024        Charges: Man2 (23), NM1 (10), TA1 (8)  Total Timed Treatment: 41 min  Total Treatment Time: 41 min        FOR REFERENCE ONLY  CERVICAL SPINE EVALUATION:   Referring Physician: Jake Seay MD    Diagnosis:  Rib pain on left side (R07.81)  Chronic bilateral thoracic back pain (M54.6,G89.29)  Shoulder weakness (R29.898)    Date of Service: 12/15/2023     Date of Onset: 6 months ago        SUBJECTIVE   PATIENT SUMMARY:  Ridge Ambrocio is a 43 year old y/o male who presents to therapy today with complaints of pain across the mid back.  Notices a shot on the front of the L ribs - pain is more persistent in that area and he will notice that when he is sitting.  States he had lift/move objects.      Feels more pain after 6 hours of sleep.  Can't sleep on his L side.  When he wakes up and he is able to get back to sleep.  States he doesn't sleep as deeply after that.    Stopped working out when Covid shut down his gym.  Kids play hockey and doesn't have time to work out.      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    Weakness in the L shoulder and rib pain.  States he is right handed.   History of current condition:wok up at 4 am with pain across his  back.  Insidious onset.  Would wake up in the morning.  X-way was (-) and then started PT.      Has night sweats - not soaking though his sheets.  States that is a new thing.      Previous therapist stated he had a L lower rib flare and more tightness in the L rib cage.  Reports after the second visit he was sent back to Dr. Madden.  Did a CAT scan and MRI which showed some minor findings.  US in [facility].      Pain rating:   3-4/10 VAS    Current functional limitations include unable to sleep through the night.  States he is very tired because not sleeping through the night.    Ridge describes prior level of function independent in all activities. Pt goals include decrease night pain so he has sleep through the night.  Past medical history was reviewed with Ridge. Significant findings include      No past medical history on file.  Past Surgical History:   Procedure Laterality Date    OTHER  1979    Pyloric stenosis      Are you being hurt, frightened, demeaned, or taken advantage of by anyone at your home or in your life?  No      Have you recently had thoughts of hurting yourself?  No    Have you tried to hurt yourself in the past?  No    ASSESSMENT   Ridge Ambrocio presents back to PT after further medical workout.  He displays a L lower rib flare with a bilateral BC position and a neutral pelvis position.  He also displays decreased PME expansion and responded well to PME expansion techniques and taping at the first visit.  He will benefit from skilled PT to return to his PLOF.    Precautions: None       OBJECTIVE:   Observation/Posture: flattened thoracic kyphosis with elongated posture with FHP    Cervical AROM:  Pain (+/-)   Flexion 40    Extension 45    R Sidebend 20    L Sidebend 35    R Rotation 70    L Rotation 70    Protrusion WFL    Retraction Decreased 25%          NIKKI Testing:    NIKKI Testing R L   Cervical Axial Rotation - -   Apical Expansion decreased WFL   Adduction Drop Test - -   Extension Drop Test NT nT    SLR 75 75   Trunk Rotation NT NT   Posterior Mediastinum Expansion Test decreased decreased   Standing Reach Test - -   Elevated and ER Anterior Rib yes yes       Shoulder AROM:      R    L   Flex 165       165   Abd 170 170   ER Hand behind head Hand behind head   IR Hand to waist Hand to waist   Hort abd 20 degrees 20 degrees       Strength UE:   5/5 MMT Scale   R  L   Shoulder flex  5     5   Shoulder ext NT NT   Abduction (C5) 5 5   ER 4+ 4+   IR 5 5   Biceps (C6) 5 5   Wrist ext (C6) 5 5   Triceps (C7) 5 5   Wrist flex (C7) 5 5   EPL (C8)  5 5   Interossei (T1) 5 5     Strength Scapular: 5/5 MMT Scale   R L   Upper trap (C4) 5 5   Rhomboids 4- 4-   Mid trap 4- 4-   Lower trap 4- 4-   Serratus 4- 4-     Flexibility:   R L   Upper trap long long   Pec Major shoirt short   Pec Minor short short   Lats short WFL     Outcome Surverys  Neck Disability Index Score  No data recorded      Palpation: TTP of the L anterior lower rib cage       Goals:     The pt was educated on the plan of care, purpose and individual goals for therapy, precautions for therapy.  All questions were answered.     1.  The pt will be independent in their HEP.  2.  Centralization of symptoms to the cervical spine.  3.  The pt will be able to complete a modified workout program.  4.  The pt will be able to sleep through the night without waking up from pain.  5.  The pt will report a 75% reduction in symptoms.      Frequency/Duration: Patient will be seen for 1-2 x/week or a total of 12 visits over a 90 day period. Treatment will include: Manual Therapy; Therapeutic Exercises; Neuromuscular Re-education; Therapeutic Activity; Patient education; Home exercise program instruction; TNE Education, Modalities as needed.    Education or treatment limitation: None  Rehab Potential: good    Certification From: 12/15/2023      To: 3/14/2024

## 2024-02-02 ENCOUNTER — APPOINTMENT (OUTPATIENT)
Dept: PHYSICAL THERAPY | Facility: HOSPITAL | Age: 44
End: 2024-02-02
Attending: PHYSICAL MEDICINE & REHABILITATION
Payer: COMMERCIAL

## 2024-02-05 ENCOUNTER — OFFICE VISIT (OUTPATIENT)
Facility: CLINIC | Age: 44
End: 2024-02-05
Payer: COMMERCIAL

## 2024-02-05 VITALS
OXYGEN SATURATION: 98 % | HEIGHT: 70 IN | HEART RATE: 82 BPM | WEIGHT: 162 LBS | SYSTOLIC BLOOD PRESSURE: 118 MMHG | DIASTOLIC BLOOD PRESSURE: 78 MMHG | BODY MASS INDEX: 23.19 KG/M2

## 2024-02-05 DIAGNOSIS — G89.29 CHRONIC BILATERAL THORACIC BACK PAIN: ICD-10-CM

## 2024-02-05 DIAGNOSIS — M54.6 CHRONIC BILATERAL THORACIC BACK PAIN: ICD-10-CM

## 2024-02-05 DIAGNOSIS — R07.81 RIB PAIN ON LEFT SIDE: Primary | ICD-10-CM

## 2024-02-05 PROCEDURE — 90471 IMMUNIZATION ADMIN: CPT | Performed by: FAMILY MEDICINE

## 2024-02-05 PROCEDURE — 3008F BODY MASS INDEX DOCD: CPT | Performed by: FAMILY MEDICINE

## 2024-02-05 PROCEDURE — 99213 OFFICE O/P EST LOW 20 MIN: CPT | Performed by: FAMILY MEDICINE

## 2024-02-05 PROCEDURE — 3074F SYST BP LT 130 MM HG: CPT | Performed by: FAMILY MEDICINE

## 2024-02-05 PROCEDURE — 90686 IIV4 VACC NO PRSV 0.5 ML IM: CPT | Performed by: FAMILY MEDICINE

## 2024-02-05 PROCEDURE — 3078F DIAST BP <80 MM HG: CPT | Performed by: FAMILY MEDICINE

## 2024-02-05 NOTE — PROGRESS NOTES
CC:    Chief Complaint   Patient presents with    Back Pain     Touch base on how PT is going       HPI: 43 year old male here to discuss chronic back pain.  Met with Dr. Seay on 12/13, and he noted that his left shoulder is weaker than his right shoulder.   He feels that is leading to some of his back pain.  He also has ribs on the left side that are a source of the pain.   Started doing PT with [facility] on 12/15, and will continue with this once a week.   Working on rib cage expansion and increasing his breath.   His pain levels have been improving, and he is no longer waking up from sleep with the pain.   He has been wearing a rib brace to help as well, which has helped.   Still gets some pain on the left front rib and along the thoracic spine.  Will take Advil off and on, but not regularly.   Will see Dr. Seay in March for a follow-up.     ROS:  General:  No fever, no fatigue, no weight changes, resolved night sweats  HEENT:  Denies congestion or nasal discharge  Cardio:  No chest pain  Pulmonary:  No cough, no SOB  GI:  No N/V/D  Dermatologic:  No rashes or bruising   MSK: improved left-sided rib pain and thoracic back pain     History reviewed. No pertinent past medical history.    Social History     Socioeconomic History    Marital status:      Spouse name: Not on file    Number of children: Not on file    Years of education: Not on file    Highest education level: Not on file   Occupational History    Not on file   Tobacco Use    Smoking status: Former    Smokeless tobacco: Never   Vaping Use    Vaping Use: Never used   Substance and Sexual Activity    Alcohol use: Yes     Alcohol/week: 18.0 standard drinks of alcohol     Types: 18 Cans of beer per week     Comment: Four Guinnesses a night     Drug use: No    Sexual activity: Yes     Partners: Female     Birth control/protection: I.U.D.   Other Topics Concern    Caffeine Concern No    Exercise No    Seat Belt No    Special Diet No    Stress Concern No     Weight Concern No   Social History Narrative    Not on file     Social Determinants of Health     Financial Resource Strain: Not on file   Food Insecurity: Not on file   Transportation Needs: Not on file   Physical Activity: Not on file   Stress: Not on file   Social Connections: Not on file   Housing Stability: Not on file       No current outpatient medications on file.       Doxycycline      Vitals:   Vitals:    02/05/24 1352   BP: 118/78   Pulse: 82   SpO2: 98%   Weight: 162 lb (73.5 kg)   Height: 5' 10\" (1.778 m)       Body mass index is 23.24 kg/m².    Physical:  General:  Alert, appropriate, no acute distress   Dermatologic:  No rashes or lesions  Back Exam     Tenderness   The patient is experiencing tenderness in the thoracic (Bilateral paravertebral muscle).      Left Shoulder Exam     Tenderness   The patient is experiencing no tenderness.     Range of Motion   The patient has normal left shoulder ROM.    Muscle Strength   The patient has normal left shoulder strength.    Other   Sensation: normal  Pulse: present         MSK: Anterior and posterior ribs 6-8 mildly tender to palpation       Assessment and Plan: 43 year old male here to follow-up on left-sided rib pain and thoracic back pain.    1. Rib pain on left side    - Greatly improved with regular PT and wearing a brace  - Will continue prn NSAID's or Tylenol, but try to limit NSAID use to prevent kidney disease   - Follow-up with Dr. Seay in March    2. Chronic bilateral thoracic back pain    - Much better overall, and will continue with PT and follow-up with physiatry       Ankita Madden DO  02/05/24  2:20 PM

## 2024-02-08 ENCOUNTER — APPOINTMENT (OUTPATIENT)
Dept: PHYSICAL THERAPY | Facility: HOSPITAL | Age: 44
End: 2024-02-08
Attending: FAMILY MEDICINE
Payer: COMMERCIAL

## 2024-02-08 ENCOUNTER — TELEPHONE (OUTPATIENT)
Dept: PHYSICAL THERAPY | Facility: HOSPITAL | Age: 44
End: 2024-02-08

## 2024-02-09 ENCOUNTER — APPOINTMENT (OUTPATIENT)
Dept: PHYSICAL THERAPY | Facility: HOSPITAL | Age: 44
End: 2024-02-09
Attending: PHYSICAL MEDICINE & REHABILITATION
Payer: COMMERCIAL

## 2024-02-16 ENCOUNTER — OFFICE VISIT (OUTPATIENT)
Dept: PHYSICAL THERAPY | Facility: HOSPITAL | Age: 44
End: 2024-02-16
Attending: FAMILY MEDICINE
Payer: COMMERCIAL

## 2024-02-16 PROCEDURE — 97112 NEUROMUSCULAR REEDUCATION: CPT | Performed by: PHYSICAL THERAPIST

## 2024-02-16 NOTE — PROGRESS NOTES
2024    Dx:  Rib pain on left side (R07.81)  Chronic bilateral thoracic back pain (M54.6,G89.29)  Shoulder weakness (R29.898)             Authorized # of Visits:  12 visits on the POC          Next MD visit: none   Fall Risk: standard         Precautions:  general  Medication Changes since last visit?: No    Subjective: States he feels god when he has his brace on.  States he had the stomach flu and couldn't wear the brace for about 3 days because of being sick.  States he he has didn't been waking up with back pain.  Reports the spot on the front of the rib was more noticeable without the brace.    Pain Ratin-3/10 VAS     Objective:      1/15/2024  Visit #4 2024  Visit #5 2024  Visit #6 2024  Visit #7   Manual Therapy   Brachial Chain Manual Techniques  1.  L AIC  2.  Sternal Technique      Manual sacral splenoid flexion    Thoracic spine mobs in prone           Therapeutic Exercise       Therapeutic Activity   Education on tension on the rib belt and to wear for 2 additional weeks secondary to length of time for connective tissue healing    Neuromuscular Education Modified all 4 belly lift    Modified all 4 belly lift with arm lift    Standing supported wall reach    Modified respiratory crawl    Serratus breathing Modified all 4 belly lift    Serratus breathing    Standing wall press    Self sacral splenoid flexion     Self sacral splenoid flexion  Modified all 4 belly lift    Modified all 4 belly lift with R arm lift    Serratus punch with L hamstring  L  Bilaterally      T8 extension in supine hooklying   TNE Education       HEP Modified all 4 belly lift with arm lift    Standing supported wall reach    Modified respiratory crawl    Serratus breathing Standing wall press    Self sacral splenoid flexion Continue his current HEP    Continue to wear the rib belt x2 weeks Modified all 4 belly lift with R arm lift    Serratus punch with L hamstring  L  Bilatearlly      T8 extension in supine  hooklying         Assessment: Ridge Cuevas has completed 7 visits of PT and has progressed well.  The pt reports less pain overall but has been unable to wean out of his rib belt.  Continues to still have posterior back pain and anterior L rib pain.  Focused on L serratus anterior and L obliques  to increase L rib IR.  Given an updated HEP.    GOALS:  1.  The pt will be independent in their HEP.  2.  Centralization of symptoms to the cervical spine.  3.  The pt will be able to complete a modified workout program.  4.  The pt will be able to sleep through the night without waking up from pain.  5.  The pt will report a 75% reduction in symptoms.      Frequency/Duration: Patient will be seen for 1-2 x/week or a total of 12 visits over a 90 day period. Treatment will include: Manual Therapy; Therapeutic Exercises; Neuromuscular Re-education; Therapeutic Activity; Patient education; Home exercise program instruction; TNE Education, Modalities as needed.    Education or treatment limitation: None  Rehab Potential: good    Certification From: 12/15/2023      To: 3/14/2024        Charges: NM3 (40)  Total Timed Treatment: 40 min  Total Treatment Time: 40 min        FOR REFERENCE ONLY  CERVICAL SPINE EVALUATION:   Referring Physician: Jake Seay MD    Diagnosis:  Rib pain on left side (R07.81)  Chronic bilateral thoracic back pain (M54.6,G89.29)  Shoulder weakness (R29.898)    Date of Service: 12/15/2023     Date of Onset: 6 months ago        SUBJECTIVE   PATIENT SUMMARY:  Ridge Ambrocio is a 43 year old y/o male who presents to therapy today with complaints of pain across the mid back.  Notices a shot on the front of the L ribs - pain is more persistent in that area and he will notice that when he is sitting.  States he had lift/move objects.      Feels more pain after 6 hours of sleep.  Can't sleep on his L side.  When he wakes up and he is able to get back to sleep.  States he doesn't sleep as deeply after  that.    Stopped working out when Covid shut down his gym.  Kids play hockey and doesn't have time to work out.      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    Weakness in the L shoulder and rib pain.  States he is right handed.   History of current condition:wok up at 4 am with pain across his back.  Insidious onset.  Would wake up in the morning.  X-way was (-) and then started PT.      Has night sweats - not soaking though his sheets.  States that is a new thing.      Previous therapist stated he had a L lower rib flare and more tightness in the L rib cage.  Reports after the second visit he was sent back to Dr. Madden.  Did a CAT scan and MRI which showed some minor findings.  US in [facility].      Pain rating:   3-4/10 VAS    Current functional limitations include unable to sleep through the night.  States he is very tired because not sleeping through the night.    Ridge describes prior level of function independent in all activities. Pt goals include decrease night pain so he has sleep through the night.  Past medical history was reviewed with Ridge. Significant findings include      No past medical history on file.  Past Surgical History:   Procedure Laterality Date    OTHER  1979    Pyloric stenosis      Are you being hurt, frightened, demeaned, or taken advantage of by anyone at your home or in your life?  No      Have you recently had thoughts of hurting yourself?  No    Have you tried to hurt yourself in the past?  No    ASSESSMENT   Ridge Ambrocio presents back to PT after further medical workout.  He displays a L lower rib flare with a bilateral BC position and a neutral pelvis position.  He also displays decreased PME expansion and responded well to PME expansion techniques and taping at the first visit.  He will benefit from skilled PT to return to his PLOF.    Precautions: None       OBJECTIVE:   Observation/Posture: flattened thoracic kyphosis with elongated posture with  FHP    Cervical AROM:  Pain (+/-)   Flexion 40    Extension 45    R Sidebend 20    L Sidebend 35    R Rotation 70    L Rotation 70    Protrusion WFL    Retraction Decreased 25%          NIKKI Testing:    NIKKI Testing R L   Cervical Axial Rotation - -   Apical Expansion decreased WFL   Adduction Drop Test - -   Extension Drop Test NT nT   SLR 75 75   Trunk Rotation NT NT   Posterior Mediastinum Expansion Test decreased decreased   Standing Reach Test - -   Elevated and ER Anterior Rib yes yes       Shoulder AROM:      R    L   Flex 165       165   Abd 170 170   ER Hand behind head Hand behind head   IR Hand to waist Hand to waist   Hort abd 20 degrees 20 degrees       Strength UE:   5/5 MMT Scale   R  L   Shoulder flex  5     5   Shoulder ext NT NT   Abduction (C5) 5 5   ER 4+ 4+   IR 5 5   Biceps (C6) 5 5   Wrist ext (C6) 5 5   Triceps (C7) 5 5   Wrist flex (C7) 5 5   EPL (C8)  5 5   Interossei (T1) 5 5     Strength Scapular: 5/5 MMT Scale   R L   Upper trap (C4) 5 5   Rhomboids 4- 4-   Mid trap 4- 4-   Lower trap 4- 4-   Serratus 4- 4-     Flexibility:   R L   Upper trap long long   Pec Major shoirt short   Pec Minor short short   Lats short WFL     Outcome Surverys  Neck Disability Index Score  No data recorded      Palpation: TTP of the L anterior lower rib cage       Goals:     The pt was educated on the plan of care, purpose and individual goals for therapy, precautions for therapy.  All questions were answered.     1.  The pt will be independent in their HEP.  2.  Centralization of symptoms to the cervical spine.  3.  The pt will be able to complete a modified workout program.  4.  The pt will be able to sleep through the night without waking up from pain.  5.  The pt will report a 75% reduction in symptoms.      Frequency/Duration: Patient will be seen for 1-2 x/week or a total of 12 visits over a 90 day period. Treatment will include: Manual Therapy; Therapeutic Exercises; Neuromuscular Re-education;  Therapeutic Activity; Patient education; Home exercise program instruction; TNE Education, Modalities as needed.    Education or treatment limitation: None  Rehab Potential: good    Certification From: 12/15/2023      To: 3/14/2024

## 2024-02-22 ENCOUNTER — OFFICE VISIT (OUTPATIENT)
Dept: PHYSICAL THERAPY | Facility: HOSPITAL | Age: 44
End: 2024-02-22
Attending: FAMILY MEDICINE
Payer: COMMERCIAL

## 2024-02-22 PROCEDURE — 97140 MANUAL THERAPY 1/> REGIONS: CPT | Performed by: PHYSICAL THERAPIST

## 2024-02-22 PROCEDURE — 97112 NEUROMUSCULAR REEDUCATION: CPT | Performed by: PHYSICAL THERAPIST

## 2024-02-22 NOTE — PROGRESS NOTES
2024      Dx:  Rib pain on left side (R07.81)  Chronic bilateral thoracic back pain (M54.6,G89.29)  Shoulder weakness (R29.898)             Authorized # of Visits:  12 visits on the POC          Next MD visit: none   Fall Risk: standard         Precautions:  general  Medication Changes since last visit?: No    Subjective: States he was a little sore the day after therapy but then that resolved.  Reports he still gets some pain when he sits up straight.  States his pain is less frequent than normal but still knows it is there.    Pain Ratin-3/10 VAS     Objective:      2024  Visit #6 2024  Visit #7 2024  Visit #8   Manual Therapy Brachial Chain Manual Techniques  1.  L AIC  2.  Sternal Technique      Manual sacral splenoid flexion    Thoracic spine mobs in prone         T spine mobs    STM thoracic parapsinals - increased tension R mid thoracic parapsinals   Therapeutic Exercise      Therapeutic Activity Education on tension on the rib belt and to wear for 2 additional weeks secondary to length of time for connective tissue healing     Neuromuscular Education Self sacral splenoid flexion  Modified all 4 belly lift    Modified all 4 belly lift with R arm lift    Serratus punch with L hamstring  L  Bilaterally      T8 extension in supine hooklying R intercostal inhibition over the swiss ball      L stance with resisted L arm reach    Modified all 4 belly lift with R arm lift         TNE Education      HEP Continue his current HEP    Continue to wear the rib belt x2 weeks Modified all 4 belly lift with R arm lift    Serratus punch with L hamstring  L  Bilatearlly      T8 extension in supine hooklying R intercostal inhibition over the swiss ball      L stance with resisted L arm reach         Assessment: Ridge Cuevas has completed 8 visits of PT and has progressed well.  The pt reports less pain overall but has been unable to wean out of his rib belt.  Continues to still have posterior back pain  and anterior L rib pain.  Added lat dorsi inhibition and increased the load on the L serratus anterior and L oblique during standing activities.  The pt has been complaint with his HEP and continues to make steady improvements.    GOALS:  1.  The pt will be independent in their HEP.  2.  Centralization of symptoms to the cervical spine.  3.  The pt will be able to complete a modified workout program.  4.  The pt will be able to sleep through the night without waking up from pain.  5.  The pt will report a 75% reduction in symptoms.      Frequency/Duration: Patient will be seen for 1-2 x/week or a total of 12 visits over a 90 day period. Treatment will include: Manual Therapy; Therapeutic Exercises; Neuromuscular Re-education; Therapeutic Activity; Patient education; Home exercise program instruction; TNE Education, Modalities as needed.    Education or treatment limitation: None  Rehab Potential: good    Certification From: 12/15/2023      To: 3/14/2024        Charges: Man2 (23), NM2 (23)  Total Timed Treatment: 46 min  Total Treatment Time: 46 min        FOR REFERENCE ONLY  CERVICAL SPINE EVALUATION:   Referring Physician: Jake Seay MD    Diagnosis:  Rib pain on left side (R07.81)  Chronic bilateral thoracic back pain (M54.6,G89.29)  Shoulder weakness (R29.898)    Date of Service: 12/15/2023     Date of Onset: 6 months ago        SUBJECTIVE   PATIENT SUMMARY:  Ridge Ambrocio is a 43 year old y/o male who presents to therapy today with complaints of pain across the mid back.  Notices a shot on the front of the L ribs - pain is more persistent in that area and he will notice that when he is sitting.  States he had lift/move objects.      Feels more pain after 6 hours of sleep.  Can't sleep on his L side.  When he wakes up and he is able to get back to sleep.  States he doesn't sleep as deeply after that.    Stopped working out when Covid shut down his gym.  Kids play hockey and doesn't have time to work  out.      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    Weakness in the L shoulder and rib pain.  States he is right handed.   History of current condition:wok up at 4 am with pain across his back.  Insidious onset.  Would wake up in the morning.  X-way was (-) and then started PT.      Has night sweats - not soaking though his sheets.  States that is a new thing.      Previous therapist stated he had a L lower rib flare and more tightness in the L rib cage.  Reports after the second visit he was sent back to Dr. Madden.  Did a CAT scan and MRI which showed some minor findings.  US in [facility].      Pain rating:   3-4/10 VAS    Current functional limitations include unable to sleep through the night.  States he is very tired because not sleeping through the night.    Ridge describes prior level of function independent in all activities. Pt goals include decrease night pain so he has sleep through the night.  Past medical history was reviewed with Ridge. Significant findings include      No past medical history on file.  Past Surgical History:   Procedure Laterality Date    OTHER  1979    Pyloric stenosis      Are you being hurt, frightened, demeaned, or taken advantage of by anyone at your home or in your life?  No      Have you recently had thoughts of hurting yourself?  No    Have you tried to hurt yourself in the past?  No    ASSESSMENT   Ridge Ambrocio presents back to PT after further medical workout.  He displays a L lower rib flare with a bilateral BC position and a neutral pelvis position.  He also displays decreased PME expansion and responded well to PME expansion techniques and taping at the first visit.  He will benefit from skilled PT to return to his PLOF.    Precautions: None       OBJECTIVE:   Observation/Posture: flattened thoracic kyphosis with elongated posture with FHP    Cervical AROM:  Pain (+/-)   Flexion 40    Extension 45    R Sidebend 20    L Sidebend 35    R Rotation 70    L  Rotation 70    Protrusion WFL    Retraction Decreased 25%          NIKKI Testing:    NIKKI Testing R L   Cervical Axial Rotation - -   Apical Expansion decreased WFL   Adduction Drop Test - -   Extension Drop Test NT nT   SLR 75 75   Trunk Rotation NT NT   Posterior Mediastinum Expansion Test decreased decreased   Standing Reach Test - -   Elevated and ER Anterior Rib yes yes       Shoulder AROM:      R    L   Flex 165       165   Abd 170 170   ER Hand behind head Hand behind head   IR Hand to waist Hand to waist   Hort abd 20 degrees 20 degrees       Strength UE:   5/5 MMT Scale   R  L   Shoulder flex  5     5   Shoulder ext NT NT   Abduction (C5) 5 5   ER 4+ 4+   IR 5 5   Biceps (C6) 5 5   Wrist ext (C6) 5 5   Triceps (C7) 5 5   Wrist flex (C7) 5 5   EPL (C8)  5 5   Interossei (T1) 5 5     Strength Scapular: 5/5 MMT Scale   R L   Upper trap (C4) 5 5   Rhomboids 4- 4-   Mid trap 4- 4-   Lower trap 4- 4-   Serratus 4- 4-     Flexibility:   R L   Upper trap long long   Pec Major shoirt short   Pec Minor short short   Lats short WFL     Outcome Surverys  Neck Disability Index Score  No data recorded      Palpation: TTP of the L anterior lower rib cage       Goals:     The pt was educated on the plan of care, purpose and individual goals for therapy, precautions for therapy.  All questions were answered.     1.  The pt will be independent in their HEP.  2.  Centralization of symptoms to the cervical spine.  3.  The pt will be able to complete a modified workout program.  4.  The pt will be able to sleep through the night without waking up from pain.  5.  The pt will report a 75% reduction in symptoms.      Frequency/Duration: Patient will be seen for 1-2 x/week or a total of 12 visits over a 90 day period. Treatment will include: Manual Therapy; Therapeutic Exercises; Neuromuscular Re-education; Therapeutic Activity; Patient education; Home exercise program instruction; TNE Education, Modalities as needed.    Education or  treatment limitation: None  Rehab Potential: good    Certification From: 12/15/2023      To: 3/14/2024

## 2024-02-29 ENCOUNTER — OFFICE VISIT (OUTPATIENT)
Dept: PHYSICAL THERAPY | Facility: HOSPITAL | Age: 44
End: 2024-02-29
Attending: FAMILY MEDICINE
Payer: COMMERCIAL

## 2024-02-29 PROCEDURE — 97112 NEUROMUSCULAR REEDUCATION: CPT | Performed by: PHYSICAL THERAPIST

## 2024-02-29 PROCEDURE — 97140 MANUAL THERAPY 1/> REGIONS: CPT | Performed by: PHYSICAL THERAPIST

## 2024-02-29 NOTE — PROGRESS NOTES
2024    Dx:  Rib pain on left side (R07.81)  Chronic bilateral thoracic back pain (M54.6,G89.29)  Shoulder weakness (R29.898)             Authorized # of Visits:  12 visits on the POC          Next MD visit: none   Fall Risk: standard         Precautions:  general  Medication Changes since last visit?: No    Subjective: States is L lower ribs was a little more noticeable this week than last week.  Tries to angle the brace and that seems to help.      Pain Ratin-3/10 VAS     Objective:      2024  Visit #8 2024  Visit #9   Manual Therapy T spine mobs    STM thoracic parapsinals - increased tension R mid thoracic parapsinals STM L lower intercostals     Manual L IR of the rib cage   Therapeutic Exercise     Therapeutic Activity     Neuromuscular Education R intercostal inhibition over the swiss ball      L stance with resisted L arm reach    Modified all 4 belly lift with R arm lift       Anterior neck inhibition in supine hooklying     L pec inhibition with #1 weight    Supine pec inhibition with R LTR with  #1 weight    Lat dorsi stretch holding the door frame       TNE Education     HEP R intercostal inhibition over the swiss ball      L stance with resisted L arm reach Anterior neck inhibition in supine hooklying     L pec inhibition with #1 weight    Supine pec inhibition with R LTR with  #1 weight    Lat dorsi stretch holding the door frame         Assessment: No adverse effect to treatment.  The pt continues to display limited L chest wall/pec mobility and therefore added more pec inhibition techniques along with PME expansion techniques.  Advised to perform PME expansion activities prior to performing L pec inhibition techniques.  Given an updated HEP.      GOALS:  1.  The pt will be independent in their HEP.  2.  Centralization of symptoms to the cervical spine.  3.  The pt will be able to complete a modified workout program.  4.  The pt will be able to sleep through the night without  waking up from pain.  5.  The pt will report a 75% reduction in symptoms.      Frequency/Duration: Patient will be seen for 1-2 x/week or a total of 12 visits over a 90 day period. Treatment will include: Manual Therapy; Therapeutic Exercises; Neuromuscular Re-education; Therapeutic Activity; Patient education; Home exercise program instruction; TNE Education, Modalities as needed.    Education or treatment limitation: None  Rehab Potential: good    Certification From: 12/15/2023      To: 3/14/2024        Charges: Man2 (23), NM2 (23)  Total Timed Treatment: 46 min  Total Treatment Time: 46 min        FOR REFERENCE ONLY  CERVICAL SPINE EVALUATION:   Referring Physician: Jake Seay MD    Diagnosis:  Rib pain on left side (R07.81)  Chronic bilateral thoracic back pain (M54.6,G89.29)  Shoulder weakness (R29.898)    Date of Service: 12/15/2023     Date of Onset: 6 months ago        SUBJECTIVE   PATIENT SUMMARY:  Ridge Ambrocio is a 43 year old y/o male who presents to therapy today with complaints of pain across the mid back.  Notices a shot on the front of the L ribs - pain is more persistent in that area and he will notice that when he is sitting.  States he had lift/move objects.      Feels more pain after 6 hours of sleep.  Can't sleep on his L side.  When he wakes up and he is able to get back to sleep.  States he doesn't sleep as deeply after that.    Stopped working out when Covid shut down his gym.  Kids play hockey and doesn't have time to work out.      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    Weakness in the L shoulder and rib pain.  States he is right handed.   History of current condition:wok up at 4 am with pain across his back.  Insidious onset.  Would wake up in the morning.  X-way was (-) and then started PT.      Has night sweats - not soaking though his sheets.  States that is a new thing.      Previous therapist stated he had a L lower rib flare and more tightness  in the L rib cage.  Reports after the second visit he was sent back to Dr. Madden.  Did a CAT scan and MRI which showed some minor findings.  US in [facility].      Pain rating:   3-4/10 VAS    Current functional limitations include unable to sleep through the night.  States he is very tired because not sleeping through the night.    Ridge describes prior level of function independent in all activities. Pt goals include decrease night pain so he has sleep through the night.  Past medical history was reviewed with Ridge. Significant findings include      No past medical history on file.  Past Surgical History:   Procedure Laterality Date    OTHER  1979    Pyloric stenosis      Are you being hurt, frightened, demeaned, or taken advantage of by anyone at your home or in your life?  No      Have you recently had thoughts of hurting yourself?  No    Have you tried to hurt yourself in the past?  No    ASSESSMENT   Ridge Ambrocio presents back to PT after further medical workout.  He displays a L lower rib flare with a bilateral BC position and a neutral pelvis position.  He also displays decreased PME expansion and responded well to PME expansion techniques and taping at the first visit.  He will benefit from skilled PT to return to his PLOF.    Precautions: None       OBJECTIVE:   Observation/Posture: flattened thoracic kyphosis with elongated posture with FHP    Cervical AROM:  Pain (+/-)   Flexion 40    Extension 45    R Sidebend 20    L Sidebend 35    R Rotation 70    L Rotation 70    Protrusion WFL    Retraction Decreased 25%          NIKKI Testing:    NIKKI Testing R L   Cervical Axial Rotation - -   Apical Expansion decreased WFL   Adduction Drop Test - -   Extension Drop Test NT nT   SLR 75 75   Trunk Rotation NT NT   Posterior Mediastinum Expansion Test decreased decreased   Standing Reach Test - -   Elevated and ER Anterior Rib yes yes       Shoulder AROM:      R    L   Flex 165       165   Abd 170 170   ER Hand behind head  Hand behind head   IR Hand to waist Hand to waist   Hort abd 20 degrees 20 degrees       Strength UE:   5/5 MMT Scale   R  L   Shoulder flex  5     5   Shoulder ext NT NT   Abduction (C5) 5 5   ER 4+ 4+   IR 5 5   Biceps (C6) 5 5   Wrist ext (C6) 5 5   Triceps (C7) 5 5   Wrist flex (C7) 5 5   EPL (C8)  5 5   Interossei (T1) 5 5     Strength Scapular: 5/5 MMT Scale   R L   Upper trap (C4) 5 5   Rhomboids 4- 4-   Mid trap 4- 4-   Lower trap 4- 4-   Serratus 4- 4-     Flexibility:   R L   Upper trap long long   Pec Major shoirt short   Pec Minor short short   Lats short WFL     Outcome Surverys  Neck Disability Index Score  No data recorded      Palpation: TTP of the L anterior lower rib cage       Goals:     The pt was educated on the plan of care, purpose and individual goals for therapy, precautions for therapy.  All questions were answered.     1.  The pt will be independent in their HEP.  2.  Centralization of symptoms to the cervical spine.  3.  The pt will be able to complete a modified workout program.  4.  The pt will be able to sleep through the night without waking up from pain.  5.  The pt will report a 75% reduction in symptoms.      Frequency/Duration: Patient will be seen for 1-2 x/week or a total of 12 visits over a 90 day period. Treatment will include: Manual Therapy; Therapeutic Exercises; Neuromuscular Re-education; Therapeutic Activity; Patient education; Home exercise program instruction; TNE Education, Modalities as needed.    Education or treatment limitation: None  Rehab Potential: good    Certification From: 12/15/2023      To: 3/14/2024

## 2024-03-07 ENCOUNTER — OFFICE VISIT (OUTPATIENT)
Dept: PHYSICAL THERAPY | Facility: HOSPITAL | Age: 44
End: 2024-03-07
Attending: FAMILY MEDICINE
Payer: COMMERCIAL

## 2024-03-07 PROCEDURE — 97140 MANUAL THERAPY 1/> REGIONS: CPT | Performed by: PHYSICAL THERAPIST

## 2024-03-07 PROCEDURE — 97530 THERAPEUTIC ACTIVITIES: CPT | Performed by: PHYSICAL THERAPIST

## 2024-03-07 NOTE — PROGRESS NOTES
3/7/2024  Patient Name: Ridge Ambrocio  YOB: 1980          MRN number:  [id number]  Referring Physician:  Ankita Madden    Progress Summary    Dx:  Rib pain on left side (R07.81)  Chronic bilateral thoracic back pain (M54.6,G89.29)  Shoulder weakness (R29.898)             Authorized # of Visits:  12 visits on the POC          Next MD visit: none   Fall Risk: standard         Precautions:  general  Medication Changes since last visit?: No    Subjective: States is feels things are going in the right direction.  States he feels the manual therapy does help.  Feels looser afterwards.  Would like to get back to regular work outs.  Feels like his L rib does not flare out as much as it did.    Pain Ratin-3/10 VAS     Objective:     Cervical AROM:  Pain (+/-)   Flexion 40    Extension 45    R Sidebend 30    L Sidebend 35    R Rotation 80    L Rotation 80    Protrusion WFL    Retraction WFL          NIKKI Testing:    NIKKI Testing R L   Cervical Axial Rotation - -   Apical Expansion WFL WFL   Adduction Drop Test - -   Extension Drop Test NT nT   SLR 75 75   Trunk Rotation NT NT   Posterior Mediastinum Expansion Test WFL WFL   Standing Reach Test - -   Elevated and ER Anterior Rib no no       Shoulder AROM:      R    L   Flex 175       175   Abd 170 170   ER Hand behind head Hand behind head   IR Hand to waist Hand to waist   Hort abd 45 degrees 45 degrees       Strength UE:   5/5 MMT Scale   R  L   Shoulder flex  5     5   Shoulder ext NT NT   Abduction (C5) 5 5   ER 4+ 4+   IR 5 5   Biceps (C6) 5 5   Wrist ext (C6) 5 5   Triceps (C7) 5 5   Wrist flex (C7) 5 5   EPL (C8)  5 5   Interossei (T1) 5 5     Strength Scapular: 5/5 MMT Scale   R L   Upper trap (C4) 5 5   Rhomboids 5 4   Mid trap 5 4   Lower trap 5 4-   Serratus 4+ 4-        2024  Visit #8 2024  Visit #9 3/7/2024  Visit #10   Manual Therapy T spine mobs    STM thoracic parapsinals - increased tension R mid thoracic parapsinals STM L lower  intercostals     Manual L IR of the rib cage STM L lower intercostals     Manual L IR of the rib cage    T spine mobs   Therapeutic Exercise      Therapeutic Activity   Discussion of gym based program  Elliptical  Bike  Pilate's program  Yoga  Avoiding impact activities     Neuromuscular Education R intercostal inhibition over the swiss ball      L stance with resisted L arm reach    Modified all 4 belly lift with R arm lift       Anterior neck inhibition in supine hooklying     L pec inhibition with #1 weight    Supine pec inhibition with R LTR with  #1 weight    Lat dorsi stretch holding the door frame        TNE Education      HEP R intercostal inhibition over the swiss ball      L stance with resisted L arm reach Anterior neck inhibition in supine hooklying     L pec inhibition with #1 weight    Supine pec inhibition with R LTR with  #1 weight    Lat dorsi stretch holding the door frame Continue with current HEP         Assessment: Ridge Ambrocio has completed 10 visits of PT and has progressed well.  He displays less lower rib flare with improved ability to recruit the L internal oblique and L serratus anterior.  He also displays neutral NIKKI positioning.  He continues to display limited thoracic spine mobility with increased TTP of the intercostals on the lower L rib cage and continues to use the rib belt during the day.   He will benefit from continued PT to return to his PLOF, transition back to workouts and away from the brace and further decrease his pain.  Recommend skilled PT 1 time per week for up to 10 visits.  The pt is in agreement with the POC.      GOALS:  1.  The pt will be independent in their HEP.  MET 3/7/2024  2.  Centralization of symptoms to the cervical spine.  MET 3/7/2024  3.  The pt will be able to complete a modified workout program.  PROGRESSING 3/7/2024  4.  The pt will be able to sleep through the night without waking up from pain.  MET 3/7/2024  5.  The pt will report a 75% reduction  in symptoms.  PROGRESSING 3/7/2024      Frequency/Duration: Patient will be seen for 1-2 x/week or a total of 10 visits over a 90 day period. Treatment will include: Manual Therapy; Therapeutic Exercises; Neuromuscular Re-education; Therapeutic Activity; Patient education; Home exercise program instruction; TNE Education, Modalities as needed.    Education or treatment limitation: None  Rehab Potential: good    Charges: Man3 (38), TA1 (8) Total Timed Treatment: 46 min  Total Treatment Time: 46 min     Patient was advised of these findings, precautions, and treatment options and has agreed to actively participate in planning and for this course of care.    Thank you for your referral. Please co-sign or sign and return this letter via fax as soon as possible to [phone]. If you have any questions, please contact me at Dept: [phone].    Sincerely,  EL GONZALEZ PT    Electronically signed by therapist: EL GONZALEZ PT    Physician's certification required: Yes  I certify the need for these services furnished under this plan of treatment and while under my care.    X___________________________________________________ Date____________________    Certification From: 3/7/2024  To:6/5/2024

## 2024-03-13 ENCOUNTER — OFFICE VISIT (OUTPATIENT)
Dept: PHYSICAL MEDICINE AND REHAB | Facility: CLINIC | Age: 44
End: 2024-03-13
Payer: COMMERCIAL

## 2024-03-13 ENCOUNTER — TELEPHONE (OUTPATIENT)
Dept: PHYSICAL THERAPY | Facility: HOSPITAL | Age: 44
End: 2024-03-13

## 2024-03-13 DIAGNOSIS — R29.898 SHOULDER WEAKNESS: ICD-10-CM

## 2024-03-13 DIAGNOSIS — R07.81 RIB PAIN ON LEFT SIDE: Primary | ICD-10-CM

## 2024-03-13 DIAGNOSIS — M54.6 CHRONIC BILATERAL THORACIC BACK PAIN: ICD-10-CM

## 2024-03-13 DIAGNOSIS — G89.29 CHRONIC BILATERAL THORACIC BACK PAIN: ICD-10-CM

## 2024-03-13 PROCEDURE — 99214 OFFICE O/P EST MOD 30 MIN: CPT | Performed by: PHYSICAL MEDICINE & REHABILITATION

## 2024-03-13 NOTE — PATIENT INSTRUCTIONS
Plan  He will continue with the PT.    The patient will continue with his home exercise program.    If the rib pain is persisting, then he would be a candidate for left rib-cartilage junction whole blood injections.    He will follow up in 3 months or sooner if needed.

## 2024-03-13 NOTE — PROGRESS NOTES
Low Back Pain [facility] & [facility]    Chief Complaint:   Chief Complaint   Patient presents with    Follow - Up     LOV 12/13/2023 Patient follows up on back pain. Continues PT which has provided much relief in his back pain and N/T. Denies new sx. C/o occasional pain still on his left ribcage. LOP 2/10     Nursing note reviewed and verified.    Patient was last seen on 12/13/2023.  He has been doing the PT with [facility] which has helped.  He is not waking up at night with the back pain.  The left rib pain is better, but the left ribs will shift at times and give him the pain.  He has been using the rib brace for 12 hours during the day.  He is not having night sweats.  The back tightness is resolved.  He is 60-70% better.    Past Medical History   History reviewed. No pertinent past medical history.    Past Surgical History   Past Surgical History:   Procedure Laterality Date    OTHER  1979    Pyloric stenosis        Family History   Family History   Problem Relation Age of Onset    No Known Problems Mother     No Known Problems Father     No Known Problems Sister     Dementia Maternal Grandmother         Alzheimer's    Dementia Paternal Grandmother         Lewy Body     Colon Cancer Paternal Grandfather     Other (Parkinson's) Maternal Uncle        Social History   Social History     Socioeconomic History    Marital status:      Spouse name: Not on file    Number of children: Not on file    Years of education: Not on file    Highest education level: Not on file   Occupational History    Not on file   Tobacco Use    Smoking status: Former    Smokeless tobacco: Never   Vaping Use    Vaping Use: Never used   Substance and Sexual Activity    Alcohol use: Yes     Alcohol/week: 6.0 standard drinks of alcohol     Types: 6 Cans of beer per week     Comment: Four Guinnesses a night     Drug use: No    Sexual activity: Yes     Partners: Female     Birth control/protection: I.U.D.   Other Topics Concern    Caffeine Concern No     Exercise No    Seat Belt No    Special Diet No    Stress Concern No    Weight Concern No   Social History Narrative    Not on file     Social Determinants of Health     Financial Resource Strain: Not on file   Food Insecurity: Not on file   Transportation Needs: Not on file   Physical Activity: Not on file   Stress: Not on file   Social Connections: Not on file   Housing Stability: Not on file       PE:  The patient does appear in his stated age in no distress.  The patient is well groomed.    Psychiatric:  The patient is alert and oriented x 3.  The patient has a normal affect and mood.      Respiratory:  No acute respiratory distress. Patient does not have a cough.    HEENT:  Extraocular muscles are intact. There is no kern icterus. Pupils are equal, round, and reactive to light. No redness or discharge bilaterally.    Skin:  There are no rashes or lesions.    Vitals:  There were no vitals filed for this visit.    Cervical Spine:    Posture: mild-moderate chin forward superiorly rotated protracted shoulder posture.   Shoulders: Level   Head: In neutral   Left scapula with slight-mild medial border winging    Chest Wall:  Tender to palpation at the lowest left side anterior rib-cartilage junction.    Thoracic spine:  Non-tender to palpation over the spinous processes and the z-joints and costovertebral junctions on the left.    Gait:    Gait: Normal gait   Sit to Stand: no difficulty      Thoracic Spine:    Scoliosis: No scoliosis present     Neurological Upper Extremity:    UE Muscle Strength: All Upper Extremity strength measurements 5/5 except:  ADM Left: 4/5  Shoulder external rotators Left: 5-/5     Assessment  1. Rib pain on left side    2. Chronic bilateral thoracic back pain    3. Shoulder weakness on left side         Plan  He will continue with the PT.    The patient will continue with his home exercise program.    If the rib pain is persisting, then he would be a candidate for left rib-cartilage junction  whole blood injections.    He will follow up in 3 months or sooner if needed.    The patient understands and agrees with the stated plan.  Jake Seay MD  3/13/2024

## 2024-03-19 ENCOUNTER — APPOINTMENT (OUTPATIENT)
Dept: PHYSICAL THERAPY | Facility: HOSPITAL | Age: 44
End: 2024-03-19
Attending: FAMILY MEDICINE
Payer: COMMERCIAL

## 2024-03-22 ENCOUNTER — OFFICE VISIT (OUTPATIENT)
Dept: PHYSICAL THERAPY | Facility: HOSPITAL | Age: 44
End: 2024-03-22
Attending: FAMILY MEDICINE
Payer: COMMERCIAL

## 2024-03-22 PROCEDURE — 97112 NEUROMUSCULAR REEDUCATION: CPT | Performed by: PHYSICAL THERAPIST

## 2024-03-22 PROCEDURE — 97140 MANUAL THERAPY 1/> REGIONS: CPT | Performed by: PHYSICAL THERAPIST

## 2024-03-22 NOTE — PROGRESS NOTES
3/22/2024  Patient Name: Ridge Ambrocio  YOB: 1980          MRN number:  [id number]  Referring Physician:  Ankita Madden    Dx:  Rib pain on left side (R07.81)  Chronic bilateral thoracic back pain (M54.6,G89.29)  Shoulder weakness (R29.898)             Authorized # of Visits:  12 visits on the POC          Next MD visit: none   Fall Risk: standard         Precautions:  general  Medication Changes since last visit?: No    Subjective: States he think he overdid it a little this week. States he is more sore than painful.  Wants to review of couple of his HEP.  Have started sit ups/push ups.    Pain Ratin-3/10 VAS     Objective:     Cervical AROM:  Pain (+/-)   Flexion 40    Extension 45    R Sidebend 30    L Sidebend 35    R Rotation 80    L Rotation 80    Protrusion WFL    Retraction WFL          NIKKI Testing:    NIKKI Testing R L   Cervical Axial Rotation - -   Apical Expansion WFL WFL   Adduction Drop Test - -   Extension Drop Test NT nT   SLR 75 75   Trunk Rotation NT NT   Posterior Mediastinum Expansion Test WFL WFL   Standing Reach Test - -   Elevated and ER Anterior Rib no no       Shoulder AROM:      R    L   Flex 175       175   Abd 170 170   ER Hand behind head Hand behind head   IR Hand to waist Hand to waist   Hort abd 45 degrees 45 degrees       Strength UE:   5/5 MMT Scale   R  L   Shoulder flex  5     5   Shoulder ext NT NT   Abduction (C5) 5 5   ER 4+ 4+   IR 5 5   Biceps (C6) 5 5   Wrist ext (C6) 5 5   Triceps (C7) 5 5   Wrist flex (C7) 5 5   EPL (C8)  5 5   Interossei (T1) 5 5     Strength Scapular: 5/5 MMT Scale   R L   Upper trap (C4) 5 5   Rhomboids 5 4   Mid trap 5 4   Lower trap 5 4-   Serratus 4+ 4-        3/7/2024  Visit #10 3/22/2024  Visit #11   Manual Therapy STM L lower intercostals     Manual L IR of the rib cage    T spine mobs STM L lower intercostals     Manual L IR of the rib cage    T spine mobs   Therapeutic Exercise     Therapeutic Activity Discussion of gym  based program  Elliptical  Bike  Pilate's program  Yoga  Avoiding impact activities      Neuromuscular Education  L pec inhibition with neutral tension techniques    R thoracic paraspinal inhibition over swiss ball   TNE Education     HEP Continue with current HEP L pec inhibition with neutral tension techniques    R thoracic paraspinal inhibition over swiss ball         Assessment: No adverse effects to treatment.  The pt continues needs further L pec minor inhibition and R thoracic paraspinals inhibition.  Overall pain decreasing.      GOALS:  1.  The pt will be independent in their HEP.  MET 3/7/2024  2.  Centralization of symptoms to the cervical spine.  MET 3/7/2024  3.  The pt will be able to complete a modified workout program.  PROGRESSING 3/7/2024  4.  The pt will be able to sleep through the night without waking up from pain.  MET 3/7/2024  5.  The pt will report a 75% reduction in symptoms.  PROGRESSING 3/7/2024      Frequency/Duration: Patient will be seen for 1-2 x/week or a total of 10 visits over a 90 day period. Treatment will include: Manual Therapy; Therapeutic Exercises; Neuromuscular Re-education; Therapeutic Activity; Patient education; Home exercise program instruction; TNE Education, Modalities as needed.    Education or treatment limitation: None  Rehab Potential: good    Charges: Man2 (23)), NM2 (23)) Total Timed Treatment: 46 min  Total Treatment Time: 46 min     Patient was advised of these findings, precautions, and treatment options and has agreed to actively participate in planning and for this course of care.    Thank you for your referral. Please co-sign or sign and return this letter via fax as soon as possible to [phone]. If you have any questions, please contact me at Dept: [phone].    Sincerely,  EL GONZALEZ PT    Electronically signed by therapist: EL GONZALEZ PT    Physician's certification required: Yes  I certify the need for these services furnished under this  plan of treatment and while under my care.    X___________________________________________________ Date____________________    Certification From: 3/7/2024  To:6/5/2024

## 2024-04-04 ENCOUNTER — OFFICE VISIT (OUTPATIENT)
Dept: PHYSICAL THERAPY | Facility: HOSPITAL | Age: 44
End: 2024-04-04
Attending: FAMILY MEDICINE
Payer: COMMERCIAL

## 2024-04-04 PROCEDURE — 97140 MANUAL THERAPY 1/> REGIONS: CPT | Performed by: PHYSICAL THERAPIST

## 2024-04-04 NOTE — PROGRESS NOTES
2024    Patient Name: Ridge Ambrocio  YOB: 1980          MRN number:  [id number]  Referring Physician:  Ankita Madden    Dx:  Rib pain on left side (R07.81)  Chronic bilateral thoracic back pain (M54.6,G89.29)  Shoulder weakness (R29.898)             Authorized # of Visits:  12 visits on the POC          Next MD visit: none   Fall Risk: standard         Precautions:  general  Medication Changes since last visit?: No    Subjective: States he had a cold over Easter so his chest is tight. States his back pain is gone.  Reports he has some days with some achy pain but states it is decreasing.  States he did his exercises intermittently.      Pain Ratin-3/10 VAS     Objective:     Cervical AROM:  Pain (+/-)   Flexion 40    Extension 45    R Sidebend 30    L Sidebend 35    R Rotation 80    L Rotation 80    Protrusion WFL    Retraction WFL          NIKKI Testing:    NIKKI Testing R L   Cervical Axial Rotation - -   Apical Expansion WFL WFL   Adduction Drop Test - -   Extension Drop Test NT nT   SLR 75 75   Trunk Rotation NT NT   Posterior Mediastinum Expansion Test WFL WFL   Standing Reach Test - -   Elevated and ER Anterior Rib no no       Shoulder AROM:      R    L   Flex 175       175   Abd 170 170   ER Hand behind head Hand behind head   IR Hand to waist Hand to waist   Hort abd 45 degrees 45 degrees       Strength UE:   5/5 MMT Scale   R  L   Shoulder flex  5     5   Shoulder ext NT NT   Abduction (C5) 5 5   ER 4+ 4+   IR 5 5   Biceps (C6) 5 5   Wrist ext (C6) 5 5   Triceps (C7) 5 5   Wrist flex (C7) 5 5   EPL (C8)  5 5   Interossei (T1) 5 5     Strength Scapular: 5/5 MMT Scale   R L   Upper trap (C4) 5 5   Rhomboids 5 4   Mid trap 5 4   Lower trap 5 4-   Serratus 4+ 4-        3/7/2024  Visit #10 3/22/2024  Visit #11 2024  Visit #12   Manual Therapy STM L lower intercostals     Manual L IR of the rib cage    T spine mobs STM L lower intercostals     Manual L IR of the rib cage    T spine  mobs STM L lower intercostals     Manual L IR of the rib cage    Rib mobs 7-8th ribs   Therapeutic Exercise      Therapeutic Activity Discussion of gym based program  Elliptical  Bike  Pilate's program  Yoga  Avoiding impact activities       Neuromuscular Education  L pec inhibition with neutral tension techniques    R thoracic paraspinal inhibition over swiss ball    TNE Education      HEP Continue with current HEP L pec inhibition with neutral tension techniques    R thoracic paraspinal inhibition over swiss ball Continue with current HEP         Assessment: Ridge Ambrocio has completed 12 visits of PT and has progressed well.  He continues to have some pain in the mid thoracic spine on the L side of the rib cage.  He is TTP of the 7th and 8th ribs with a positional fault in that area.  He has been very compliant with his HEP and is progressing well towards his remaining goals.  Recommend continued PT 1 time per week for up to 8 visits and then re-assess at that time.  The pt is in agreement with the POC.        GOALS:  1.  The pt will be independent in their HEP.  MET 3/7/2024  2.  Centralization of symptoms to the cervical spine.  MET 3/7/2024  3.  The pt will be able to complete a modified workout program.  PROGRESSING 4/4/2024  4.  The pt will be able to sleep through the night without waking up from pain.  MET 3/7/2024  5.  The pt will report a 75% reduction in symptoms.  PROGRESSING  4/4/2024    Frequency/Duration: Patient will be seen for 1-2 x/week or a total of 10 visits over a 90 day period. Treatment will include: Manual Therapy; Therapeutic Exercises; Neuromuscular Re-education; Therapeutic Activity; Patient education; Home exercise program instruction; TNE Education, Modalities as needed.    Education or treatment limitation: None  Rehab Potential: good    Charges: Man 3 (45) Total Timed Treatment: 45 min  Total Treatment Time: 45 min     Patient was advised of these findings, precautions, and treatment  options and has agreed to actively participate in planning and for this course of care.    Thank you for your referral. Please co-sign or sign and return this letter via fax as soon as possible to [phone]. If you have any questions, please contact me at Dept: [phone].    Sincerely,  EL GONZALEZ PT    Electronically signed by therapist: EL GONZALEZ PT    Physician's certification required: Yes  I certify the need for these services furnished under this plan of treatment and while under my care.    X___________________________________________________ Date____________________    Certification From: 3/7/2024  To:6/5/2024

## 2024-04-09 ENCOUNTER — APPOINTMENT (OUTPATIENT)
Dept: PHYSICAL THERAPY | Facility: HOSPITAL | Age: 44
End: 2024-04-09
Attending: FAMILY MEDICINE
Payer: COMMERCIAL

## 2024-04-11 ENCOUNTER — APPOINTMENT (OUTPATIENT)
Dept: PHYSICAL THERAPY | Facility: HOSPITAL | Age: 44
End: 2024-04-11
Attending: PHYSICAL MEDICINE & REHABILITATION
Payer: COMMERCIAL

## 2024-04-11 ENCOUNTER — TELEPHONE (OUTPATIENT)
Dept: PHYSICAL THERAPY | Facility: HOSPITAL | Age: 44
End: 2024-04-11

## 2024-04-14 ENCOUNTER — HOSPITAL ENCOUNTER (OUTPATIENT)
Age: 44
Discharge: HOME | End: 2024-04-14
Payer: COMMERCIAL

## 2024-04-14 VITALS
HEART RATE: 92 BPM | TEMPERATURE: 98 F | RESPIRATION RATE: 20 BRPM | SYSTOLIC BLOOD PRESSURE: 134 MMHG | DIASTOLIC BLOOD PRESSURE: 90 MMHG | OXYGEN SATURATION: 99 %

## 2024-04-14 DIAGNOSIS — J06.9 VIRAL URI WITH COUGH: Primary | ICD-10-CM

## 2024-04-14 LAB
S PYO AG THROAT QL: NEGATIVE
SARS-COV-2 RNA RESP QL NAA+PROBE: NOT DETECTED

## 2024-04-14 RX ORDER — BENZONATATE 100 MG/1
100 CAPSULE ORAL 3 TIMES DAILY PRN
Qty: 30 CAPSULE | Refills: 0 | Status: SHIPPED | OUTPATIENT
Start: 2024-04-14 | End: 2024-04-14

## 2024-04-14 RX ORDER — BENZONATATE 100 MG/1
100 CAPSULE ORAL 3 TIMES DAILY PRN
Qty: 30 CAPSULE | Refills: 0 | Status: SHIPPED | OUTPATIENT
Start: 2024-04-14

## 2024-04-14 NOTE — ED INITIAL ASSESSMENT (HPI)
Pt here with complaints of cough , congestion , headache and sore throat that began 1 week ago , pt denies any fevers or sob

## 2024-04-14 NOTE — ED PROVIDER NOTES
Patient Seen in: [facility]      History     Chief Complaint   Patient presents with    Cough/URI    Sore Throat     Stated Complaint: Headache and Sore Throat    Subjective:   44 y/o male with unremarkable medical history presents with complaints of congestion, nonproductive cough, headache, sore throat onset Monday.  States feels feverish and has chills at night.  No chest pain, shortness of breath, abdominal pain, nausea/vomiting/diarrhea.  Takes NyQuil at night.  Tylenol and Advil for pain.              Objective:   History reviewed. No pertinent past medical history.           Past Surgical History:   Procedure Laterality Date    Other  1979    Pyloric stenosis                 Social History     Socioeconomic History    Marital status:    Tobacco Use    Smoking status: Former    Smokeless tobacco: Never   Vaping Use    Vaping status: Never Used   Substance and Sexual Activity    Alcohol use: Yes     Alcohol/week: 6.0 standard drinks of alcohol     Types: 6 Cans of beer per week     Comment: Four Guinnesses a night     Drug use: No    Sexual activity: Yes     Partners: Female     Birth control/protection: I.U.D.   Other Topics Concern    Caffeine Concern No    Exercise No    Seat Belt No    Special Diet No    Stress Concern No    Weight Concern No              Review of Systems   Constitutional:  Positive for chills and fever (subjective at night).   HENT:  Positive for congestion and sore throat.    Respiratory:  Positive for cough. Negative for shortness of breath.    Cardiovascular:  Negative for chest pain.   Gastrointestinal:  Negative for abdominal pain, diarrhea, nausea and vomiting.   Neurological:  Negative for headaches.   All other systems reviewed and are negative.      Positive for stated complaint: Headache and Sore Throat  Other systems are as noted in HPI.  Constitutional and vital signs reviewed.      All other systems reviewed and negative except as noted  above.    Physical Exam     ED Triage Vitals [04/14/24 0923]   /90   Pulse 92   Resp 20   Temp 98.2 °F (36.8 °C)   Temp src Oral   SpO2 99 %   O2 Device None (Room air)       Current:/90   Pulse 92   Temp 98.2 °F (36.8 °C) (Oral)   Resp 20   SpO2 99%         Physical Exam  Vitals and nursing note reviewed.   Constitutional:       General: He is not in acute distress.     Appearance: Normal appearance. He is not ill-appearing.   HENT:      Head: Normocephalic.      Right Ear: Tympanic membrane and external ear normal.      Left Ear: Tympanic membrane and external ear normal.      Nose: Nose normal.      Mouth/Throat:      Mouth: Mucous membranes are moist.      Pharynx: Oropharynx is clear. Uvula midline.   Cardiovascular:      Rate and Rhythm: Normal rate and regular rhythm.   Pulmonary:      Effort: Pulmonary effort is normal.      Breath sounds: Normal breath sounds.   Musculoskeletal:         General: Normal range of motion.      Cervical back: Normal range of motion and neck supple.   Skin:     General: Skin is warm and dry.      Capillary Refill: Capillary refill takes less than 2 seconds.   Neurological:      Mental Status: He is alert and oriented to person, place, and time.   Psychiatric:         Behavior: Behavior is cooperative.               ED Course     Labs Reviewed   POCT RAPID STREP - Normal   RAPID SARS-COV-2 BY PCR - Normal                      MDM                                         Medical Decision Making  Patient is well-appearing.  I discussed differentials with patient including but not limited to viral uri vs viral/strep pharyngitis  Rapid COVID and Strep negative  Push fluids, cool mist humidifier, warm salt water gargles, good hand washing  Rx benzonatate  otc meds prn  Fu with PCP. Return/ ED precautions discussed      Problems Addressed:  Viral URI with cough: acute illness or injury    Amount and/or Complexity of Data Reviewed  Labs: ordered. Decision-making details  documented in ED Course.        Disposition and Plan     Clinical Impression:  1. Viral URI with cough         Disposition:  Discharge  4/14/2024  9:49 am    Follow-up:  Ankita Madden DO  [address] [address] [phone]    Schedule an appointment as soon as possible for a visit             Medications Prescribed:  Discharge Medication List as of 4/14/2024 10:00 AM        START taking these medications    Details   benzonatate 100 MG Oral Cap Take 1 capsule (100 mg total) by mouth 3 (three) times daily as needed for cough., Normal, Disp-30 capsule, R-0

## 2024-04-15 ENCOUNTER — HOSPITAL ENCOUNTER (OUTPATIENT)
Age: 44
Discharge: HOME | End: 2024-04-15
Payer: COMMERCIAL

## 2024-04-15 VITALS
SYSTOLIC BLOOD PRESSURE: 140 MMHG | RESPIRATION RATE: 20 BRPM | HEART RATE: 81 BPM | TEMPERATURE: 98 F | OXYGEN SATURATION: 100 % | DIASTOLIC BLOOD PRESSURE: 94 MMHG

## 2024-04-15 DIAGNOSIS — H10.31 ACUTE CONJUNCTIVITIS OF RIGHT EYE, UNSPECIFIED ACUTE CONJUNCTIVITIS TYPE: Primary | ICD-10-CM

## 2024-04-15 PROCEDURE — 99214 OFFICE O/P EST MOD 30 MIN: CPT | Performed by: NURSE PRACTITIONER

## 2024-04-15 RX ORDER — POLYMYXIN B SULFATE AND TRIMETHOPRIM 1; 10000 MG/ML; [USP'U]/ML
1 SOLUTION OPHTHALMIC 4 TIMES DAILY
Qty: 10 ML | Refills: 0 | Status: SHIPPED | OUTPATIENT
Start: 2024-04-15 | End: 2024-04-20

## 2024-04-15 NOTE — ED PROVIDER NOTES
Patient Seen in: [facility]      History     Chief Complaint   Patient presents with    Eye Problem     Stated Complaint: pink eye/ discharge    Subjective:   Well-appearing 43-year-old male with a history of pyloric stenosis presents with complaints of a nonproductive cough and nasal congestion for the past week.  Patient communicates that he was seen here and diagnosed with a viral illness after testing negative for strep and COVID-19.  Patient communicates that today morning he woke up with right eye redness and crusted shut.  Patient denies trauma to eye.  Patient denies foreign body sensation.  Patient does communicate a ana sensation in right eye.  Patient denies fever.  Patient denies visual disturbances or loss of vision.                Objective:   History reviewed. No pertinent past medical history.           Past Surgical History:   Procedure Laterality Date    Other  1979    Pyloric stenosis                 Social History     Socioeconomic History    Marital status:    Tobacco Use    Smoking status: Former    Smokeless tobacco: Never   Vaping Use    Vaping status: Never Used   Substance and Sexual Activity    Alcohol use: Yes     Alcohol/week: 6.0 standard drinks of alcohol     Types: 6 Cans of beer per week     Comment: Four Guinnesses a night     Drug use: No    Sexual activity: Yes     Partners: Female     Birth control/protection: I.U.D.   Other Topics Concern    Caffeine Concern No    Exercise No    Seat Belt No    Special Diet No    Stress Concern No    Weight Concern No              Review of Systems    Positive for stated complaint: pink eye/ discharge  Other systems are as noted in HPI.  Constitutional and vital signs reviewed.      All other systems reviewed and negative except as noted above.    Physical Exam     ED Triage Vitals [04/15/24 0837]   BP (!) 140/94   Pulse 81   Resp (!) 99   Temp 98.2 °F (36.8 °C)   Temp src Oral   SpO2 100 %   O2 Device None (Room air)        Current:BP (!) 140/94   Pulse 81   Temp 98.2 °F (36.8 °C) (Oral)   Resp 20   SpO2 100%         Physical Exam  VS: Vital signs reviewed. 02 saturation within normal limits for this patient.    General: Patient is awake and alert, oriented to person, place and time. Pt appears non-toxic.     HEENT: Head is normocephalic, atraumatic. Pupils reactive bilaterally. Nonicteric sclera. No facial droop or slurred speech. No oral lesions or pallor. Mucous membranes moist. Left and right tympanic membranes normal.     Nose: Congestion present. No rhinorrhea.      General: Lids are normal.         Right eye: Discharge present. No hordeolum.         Left eye: No discharge or hordeolum.      Extraocular Movements: Extraocular movements intact.      Conjunctiva/sclera:      Right eye: Right conjunctiva is injected. Exudate present.      Left eye: Left conjunctiva is not injected. No exudate.     Pupils: Pupils are equal, round, and reactive to light.     Neck: No cervical lymphadenopathy. Supple. Normal ROM.    Lungs: Good inspiratory effort.  No accessory muscle use or tachypnea.    Extremities: No focal swelling or tenderness. Capillary refill noted.     Skin: Warm, dry and normal in color.     Psychiatric: Normal affect, judgement normal, insight normal.     CNS: Moves all 4 extremities. Interacts appropriately. No gait abnormality. Memory normal.        ED Course   Labs Reviewed - No data to display    MDM   Medical Decision Making  Well-appearing.  IC chart from 4/14/2024 reviewed. Case discussed with Dr. Baez.   Viral conjunctivitis versus bacterial conjunctivitis versus allergic conjunctivitis were discussed with patient.  Prescription for Polytrim ophthalmic solution was sent to pharmacy on file.  I discussed warm compresses as needed.  Close PMD follow-up as well as return precautions discussed.    Problems Addressed:  Acute conjunctivitis of right eye, unspecified acute conjunctivitis type: acute illness or  injury    Risk  OTC drugs.      Disposition and Plan     Clinical Impression:  1. Acute conjunctivitis of right eye, unspecified acute conjunctivitis type         Disposition:  Discharge  4/15/2024  8:59 am    Follow-up:  Ankita Madden DO  [address] [address]  [phone]    In 1 week  As needed          Medications Prescribed:  Discharge Medication List as of 4/15/2024  9:04 AM        START taking these medications    Details   polymyxin B-trimethoprim 61288-4.1 UNIT/ML-% Ophthalmic Solution Place 1 drop into the right eye in the morning, at noon, in the evening, and at bedtime for 5 days., Normal, Disp-10 mL, R-0

## 2024-04-15 NOTE — ED INITIAL ASSESSMENT (HPI)
Pt presents with cough and congestion x 1 week. Seen at [facility] yesterday, Strep/Covid - both negative.     Pt woke today in am with right eye redness and discharge.

## 2024-05-02 ENCOUNTER — APPOINTMENT (OUTPATIENT)
Dept: PHYSICAL THERAPY | Facility: HOSPITAL | Age: 44
End: 2024-05-02
Attending: FAMILY MEDICINE
Payer: COMMERCIAL

## 2024-05-13 ENCOUNTER — OFFICE VISIT (OUTPATIENT)
Dept: PHYSICAL THERAPY | Facility: HOSPITAL | Age: 44
End: 2024-05-13
Attending: FAMILY MEDICINE
Payer: COMMERCIAL

## 2024-05-13 PROCEDURE — 97530 THERAPEUTIC ACTIVITIES: CPT | Performed by: PHYSICAL THERAPIST

## 2024-05-13 PROCEDURE — 97112 NEUROMUSCULAR REEDUCATION: CPT | Performed by: PHYSICAL THERAPIST

## 2024-05-13 NOTE — PROGRESS NOTES
5/13/2024    Patient Name: Ridge Ambrocio  YOB: 1980          MRN number:  [id number]  Referring Physician:  Ankita Madden    Dx:  Rib pain on left side (R07.81)  Chronic bilateral thoracic back pain (M54.6,G89.29)  Shoulder weakness (R29.898)             Authorized # of Visits:  12 visits on the POC          Next MD visit: none   Fall Risk: standard         Precautions:  general  Medication Changes since last visit?: No    Subjective: States he had been sick for a while so he had to stop doing his HEP for a while.  Reports he had back to back illnesses and had a bad cough.    States he still gets pain in the back and in the front of the rib.  Reports he no longer wakes up with pain and doesn't have the spider web pain.      Pain Rating:  3/10 VAS     Objective:     Cervical AROM:  Pain (+/-)   Flexion 40    Extension 45    R Sidebend 30    L Sidebend 35    R Rotation 80    L Rotation 80    Protrusion WFL    Retraction WFL          NIKKI Testing:    NIKKI Testing R L   Cervical Axial Rotation - -   Apical Expansion WFL WFL   Adduction Drop Test - -   Extension Drop Test NT nT   SLR 75 75   Trunk Rotation NT NT   Posterior Mediastinum Expansion Test WFL WFL   Standing Reach Test - -   Elevated and ER Anterior Rib no no       Shoulder AROM:      R    L   Flex 175       175   Abd 170 170   ER Hand behind head Hand behind head   IR Hand to waist Hand to waist   Hort abd 45 degrees 45 degrees       Strength UE:   5/5 MMT Scale   R  L   Shoulder flex  5     5   Shoulder ext NT NT   Abduction (C5) 5 5   ER 4+ 4+   IR 5 5   Biceps (C6) 5 5   Wrist ext (C6) 5 5   Triceps (C7) 5 5   Wrist flex (C7) 5 5   EPL (C8)  5 5   Interossei (T1) 5 5     Strength Scapular: 5/5 MMT Scale   R L   Upper trap (C4) 5 5   Rhomboids 5 4   Mid trap 5 4   Lower trap 5 4-   Serratus 4+ 4-        3/7/2024  Visit #10 3/22/2024  Visit #11 4/4/2024  Visit #12 5/13/2024  Visit #13   Manual Therapy STM L lower intercostals     Manual L IR  of the rib cage    T spine mobs STM L lower intercostals     Manual L IR of the rib cage    T spine mobs STM L lower intercostals     Manual L IR of the rib cage    Rib mobs 7-8th ribs    Therapeutic Exercise       Therapeutic Activity Discussion of gym based program  Elliptical  Bike  Pilate's program  Yoga  Avoiding impact activities     Education on options for continued rib pain management   Neuromuscular Education  L pec inhibition with neutral tension techniques    R thoracic paraspinal inhibition over swiss ball  Resisted R glut max in standing    L SL resisted R glut max    Supine hooklying R glut max    Serratus breathing   TNE Education       HEP Continue with current HEP L pec inhibition with neutral tension techniques    R thoracic paraspinal inhibition over swiss ball Continue with current HEP L SL resisted R glut max    Supine hooklying R glut max         Assessment: No adverse effect to treatment  The pt displayed (+) PART bilaterally but 4/5 HADLT bilaterally.  Also decreased horizontal abduction bilaterally and L cervical side bending.  Focused on R glut max activities.  All tests (-) by the end of the session.    Given an updated HEP.  May need further R lat inhibition and R pec inhibition at the next session if testing remains positive.        GOALS:  1.  The pt will be independent in their HEP.  MET 3/7/2024  2.  Centralization of symptoms to the cervical spine.  MET 3/7/2024  3.  The pt will be able to complete a modified workout program.  PROGRESSING 4/4/2024  4.  The pt will be able to sleep through the night without waking up from pain.  MET 3/7/2024  5.  The pt will report a 75% reduction in symptoms.  PROGRESSING  4/4/2024    Frequency/Duration: Patient will be seen for 1-2 x/week or a total of 10 visits over a 90 day period. Treatment will include: Manual Therapy; Therapeutic Exercises; Neuromuscular Re-education; Therapeutic Activity; Patient education; Home exercise program instruction;  TNE Education, Modalities as needed.    Education or treatment limitation: None  Rehab Potential: good    Charges: NM 3 (38), TA1(8) Total Timed Treatment: 46 min  Total Treatment Time: 46 min     Patient was advised of these findings, precautions, and treatment options and has agreed to actively participate in planning and for this course of care.    Thank you for your referral. Please co-sign or sign and return this letter via fax as soon as possible to [phone]. If you have any questions, please contact me at Dept: [phone].    Sincerely,  EL GONZALEZ PT    Electronically signed by therapist: EL GONZALEZ PT    Physician's certification required: Yes  I certify the need for these services furnished under this plan of treatment and while under my care.    X___________________________________________________ Date____________________    Certification From: 3/7/2024  To:6/5/2024

## 2024-05-23 ENCOUNTER — APPOINTMENT (OUTPATIENT)
Dept: PHYSICAL THERAPY | Facility: HOSPITAL | Age: 44
End: 2024-05-23
Attending: FAMILY MEDICINE
Payer: COMMERCIAL

## 2024-05-30 ENCOUNTER — OFFICE VISIT (OUTPATIENT)
Dept: PHYSICAL THERAPY | Facility: HOSPITAL | Age: 44
End: 2024-05-30
Attending: FAMILY MEDICINE
Payer: COMMERCIAL

## 2024-05-30 PROCEDURE — 97112 NEUROMUSCULAR REEDUCATION: CPT | Performed by: PHYSICAL THERAPIST

## 2024-05-30 PROCEDURE — 97140 MANUAL THERAPY 1/> REGIONS: CPT | Performed by: PHYSICAL THERAPIST

## 2024-05-30 NOTE — PROGRESS NOTES
5/30/2024    Patient Name: Ridge Ambrocio  YOB: 1980          MRN number:  [id number]  Referring Physician:  Ankita Madden    Dx:  Rib pain on left side (R07.81)  Chronic bilateral thoracic back pain (M54.6,G89.29)  Shoulder weakness (R29.898)             Authorized # of Visits:  12 visits on the POC          Next MD visit: none   Fall Risk: standard         Precautions:  general  Medication Changes since last visit?: No    Subjective: States his L lower rib cage if flaring less.  Reports he is more achy after he does more physical activity.  Reports he was digging holes and was very sore after that.  Not working out at the gym.  Has not been using the brace as much because he doesn't feel as unstable.      Pain Rating:  3/10 VAS     Objective:     Cervical AROM:  Pain (+/-)   Flexion 40    Extension 45    R Sidebend 30    L Sidebend 35    R Rotation 80    L Rotation 80    Protrusion WFL    Retraction WFL          NIKKI Testing:    NIKKI Testing R L   Cervical Axial Rotation - -   Apical Expansion WFL WFL   Adduction Drop Test - -   Extension Drop Test NT nT   SLR 75 75   Trunk Rotation NT NT   Posterior Mediastinum Expansion Test WFL WFL   Standing Reach Test - -   Elevated and ER Anterior Rib no no       Shoulder AROM:      R    L   Flex 175       175   Abd 170 170   ER Hand behind head Hand behind head   IR Hand to waist Hand to waist   Hort abd 45 degrees 45 degrees       Strength UE:   5/5 MMT Scale   R  L   Shoulder flex  5     5   Shoulder ext NT NT   Abduction (C5) 5 5   ER 4+ 4+   IR 5 5   Biceps (C6) 5 5   Wrist ext (C6) 5 5   Triceps (C7) 5 5   Wrist flex (C7) 5 5   EPL (C8)  5 5   Interossei (T1) 5 5     Strength Scapular: 5/5 MMT Scale   R L   Upper trap (C4) 5 5   Rhomboids 5 4   Mid trap 5 4   Lower trap 5 4-   Serratus 4+ 4-        3/7/2024  Visit #10 3/22/2024  Visit #11 4/4/2024  Visit #12 5/13/2024  Visit #13 5/30/2024  Visit #14   Manual Therapy STM L lower intercostals     Manual L  IR of the rib cage    T spine mobs STM L lower intercostals     Manual L IR of the rib cage    T spine mobs STM L lower intercostals     Manual L IR of the rib cage    Rib mobs 7-8th ribs  STM/mobilization of the L rib cage - 8th rib   Therapeutic Exercise        Therapeutic Activity Discussion of gym based program  Elliptical  Bike  Leo's program  Yoga  Avoiding impact activities     Education on options for continued rib pain management    Neuromuscular Education  L pec inhibition with neutral tension techniques    R thoracic paraspinal inhibition over swiss ball  Resisted R glut max in standing    L SL resisted R glut max    Supine hooklying R glut max    Serratus breathing NIKKI Wall Supported Squat with Balloon     Sidelying Lateral Thorax Expansion with Arched Ishan-Barrel      Sidelying Swiss Ball with Right Apical Expansion and Balloon   TNE Education        HEP Continue with current HEP L pec inhibition with neutral tension techniques    R thoracic paraspinal inhibition over swiss ball Continue with current HEP L SL resisted R glut max    Supine hooklying R glut max NIKKI Wall Supported Squat with Balloon     Sidelying Lateral Thorax Expansion with Arched Ishan-Barrel      Sidelying Swiss Ball with Right Apical Expansion and Balloon         Assessment: No adverse effect to treatment  Pain is more localized but continues to display a positional; fault of the 8th rib with TTP in both the anterior and posterior aspect of the rib.  Reports some relief of session and added activities to increase L side compression.      GOALS:  1.  The pt will be independent in their HEP.  MET 3/7/2024  2.  Centralization of symptoms to the cervical spine.  MET 3/7/2024  3.  The pt will be able to complete a modified workout program.  PROGRESSING 4/4/2024  4.  The pt will be able to sleep through the night without waking up from pain.  MET 3/7/2024  5.  The pt will report a 75% reduction in symptoms.  PROGRESSING   4/4/2024    Frequency/Duration: Patient will be seen for 1-2 x/week or a total of 10 visits over a 90 day period. Treatment will include: Manual Therapy; Therapeutic Exercises; Neuromuscular Re-education; Therapeutic Activity; Patient education; Home exercise program instruction; TNE Education, Modalities as needed.    Education or treatment limitation: None  Rehab Potential: good    Charges: NM 2 (23), Man2 (23)Total Timed Treatment: 46 min  Total Treatment Time: 46 min     Patient was advised of these findings, precautions, and treatment options and has agreed to actively participate in planning and for this course of care.    Thank you for your referral. Please co-sign or sign and return this letter via fax as soon as possible to [phone]. If you have any questions, please contact me at Dept: [phone].    Sincerely,  EL GONZALEZ PT    Electronically signed by therapist: EL GONZALEZ PT    Physician's certification required: Yes  I certify the need for these services furnished under this plan of treatment and while under my care.    X___________________________________________________ Date____________________    Certification From: 3/7/2024  To:6/5/2024

## 2024-06-07 ENCOUNTER — OFFICE VISIT (OUTPATIENT)
Dept: PHYSICAL THERAPY | Facility: HOSPITAL | Age: 44
End: 2024-06-07
Attending: PHYSICAL MEDICINE & REHABILITATION
Payer: COMMERCIAL

## 2024-06-07 PROCEDURE — 97140 MANUAL THERAPY 1/> REGIONS: CPT | Performed by: PHYSICAL THERAPIST

## 2024-06-07 PROCEDURE — 97112 NEUROMUSCULAR REEDUCATION: CPT | Performed by: PHYSICAL THERAPIST

## 2024-06-07 NOTE — PROGRESS NOTES
6/7/2024    PROGRESS NOTE  Attended 15 visits total    Patient Name: Ridge Ambrocio  YOB: 1980          MRN number:  [id number]  Referring Physician:  Ankita Madden    Dx:  Rib pain on left side (R07.81)  Chronic bilateral thoracic back pain (M54.6,G89.29)  Shoulder weakness (R29.898)             Authorized # of Visits:  12 visits on the POC          Next MD visit: none   Fall Risk: standard         Precautions:  general  Medication Changes since last visit?: No    Subjective: States he was slightly sore after the weekend on the anterior ribs..   Reports he did the new balloon exercise which feels is helping.  Reports overall     Pain Rating:  3/10 VAS     Objective:     Cervical AROM:  Pain (+/-)   Flexion 40    Extension 45    R Sidebend 30    L Sidebend 35    R Rotation 80    L Rotation 80    Protrusion WFL    Retraction WFL          NIKKI Testing:    NIKKI Testing R L   Cervical Axial Rotation - -   Apical Expansion WFL WFL   Adduction Drop Test - -   Extension Drop Test NT nT   SLR 75 75   Trunk Rotation NT NT   Posterior Mediastinum Expansion Test WFL WFL   Standing Reach Test - -   Elevated and ER Anterior Rib no no       Shoulder AROM:      R    L   Flex 175       175   Abd 170 170   ER Hand behind head Hand behind head   IR Hand to waist Hand to waist   Hort abd 45 degrees 45 degrees       Strength UE:   5/5 MMT Scale   R  L   Shoulder flex  5     5   Shoulder ext NT NT   Abduction (C5) 5 5   ER 4+ 4+   IR 5 5   Biceps (C6) 5 5   Wrist ext (C6) 5 5   Triceps (C7) 5 5   Wrist flex (C7) 5 5   EPL (C8)  5 5   Interossei (T1) 5 5     Strength Scapular: 5/5 MMT Scale   R L   Upper trap (C4) 5 5   Rhomboids 5 4+   Mid trap 5 4+   Lower trap 5 4+   Serratus 4+ 4        5/13/2024  Visit #13 5/30/2024  Visit #14 6/7/2024  Visit #15   Manual Therapy  STM/mobilization of the L rib cage - 8th rib STM/mobilization of the L rib cage - 8th rib   Therapeutic Exercise      Therapeutic Activity Education on  options for continued rib pain management     Neuromuscular Education Resisted R glut max in standing    L SL resisted R glut max    Supine hooklying R glut max    Serratus breathing NIKKI Wall Supported Squat with Balloon     Sidelying Lateral Thorax Expansion with Arched Ishan-Barrel      Sidelying Swiss Ball with Right Apical Expansion and Balloon All 4 belly lift with R arm on box       TNE Education      HEP L SL resisted R glut max    Supine hooklying R glut max NIKKI Wall Supported Squat with Balloon     Sidelying Lateral Thorax Expansion with Arched Ishan-Barrel      Sidelying Swiss Ball with Right Apical Expansion and Balloon All 4 belly lift with R arm on box         Assessment: Ridge Ambrocio has completed 15 visits and continues to make slow steady improvement.  He is now wearing is rib brace less often, sleeping through the night and able to be more active at home.  He also reports L rib flaring L and only has pain in the L anterior rib cage and is no longer having pain in the posterior aspect of the L ribs.  Does his HEP regularly and gets relief from it.  He continues to be TTP of the L anterior portion of the L 8th rib and responds well to manual therapy.  He will benefit from continued PT to return to his PLOF.  Recommend continued PT1 time per week to 1 every 3 weeks for up to 8 visits.  The pt in agreement with the POC.        GOALS:  1.  The pt will be independent in their HEP.  MET 3/7/2024  2.  Centralization of symptoms to the cervical spine.  MET 3/7/2024  3.  The pt will be able to complete a modified workout program.  PROGRESSING 6/7/2024  4.  The pt will be able to sleep through the night without waking up from pain.  MET 3/7/2024  5.  The pt will report a 75% reduction in symptoms.  PROGRESSING  6/7/2024    Frequency/Duration: Patient will be seen for 1 x/week or a total of 8 visits over a 90 day period. Treatment will include: Manual Therapy; Therapeutic Exercises; Neuromuscular Re-education;  Therapeutic Activity; Patient education; Home exercise program instruction; TNE Education, Modalities as needed.    Education or treatment limitation: None  Rehab Potential: good    Charges: NM 1 (8) Man3 (38) Total Timed Treatment: 46 min  Total Treatment Time: 46 min     Patient was advised of these findings, precautions, and treatment options and has agreed to actively participate in planning and for this course of care.    Thank you for your referral. Please co-sign or sign and return this letter via fax as soon as possible to [phone]. If you have any questions, please contact me at Dept: [phone].    Sincerely,  EL GONZALEZ PT    Electronically signed by therapist: EL GONZALEZ PT    Physician's certification required: Yes  I certify the need for these services furnished under this plan of treatment and while under my care.    X___________________________________________________ Date____________________    Certification From: 6/7/2024 to 8/23/24

## 2024-06-12 ENCOUNTER — OFFICE VISIT (OUTPATIENT)
Dept: PHYSICAL MEDICINE AND REHAB | Facility: CLINIC | Age: 44
End: 2024-06-12
Payer: COMMERCIAL

## 2024-06-12 VITALS — BODY MASS INDEX: 23.19 KG/M2 | HEIGHT: 70 IN | WEIGHT: 162 LBS

## 2024-06-12 DIAGNOSIS — G89.29 CHRONIC BILATERAL THORACIC BACK PAIN: ICD-10-CM

## 2024-06-12 DIAGNOSIS — R07.81 RIB PAIN ON LEFT SIDE: Primary | ICD-10-CM

## 2024-06-12 DIAGNOSIS — R29.898 SHOULDER WEAKNESS: ICD-10-CM

## 2024-06-12 DIAGNOSIS — M54.6 CHRONIC BILATERAL THORACIC BACK PAIN: ICD-10-CM

## 2024-06-12 PROCEDURE — 3008F BODY MASS INDEX DOCD: CPT | Performed by: PHYSICAL MEDICINE & REHABILITATION

## 2024-06-12 PROCEDURE — 99213 OFFICE O/P EST LOW 20 MIN: CPT | Performed by: PHYSICAL MEDICINE & REHABILITATION

## 2024-06-12 NOTE — PROGRESS NOTES
Low Back Pain H & P    Chief Complaint:   Chief Complaint   Patient presents with    Follow - Up     LOV 3/13/24 pt is here for a follow up . No N/T. Not taking any pain meds or muscle relaxer's. Currently in physical therpay and states it's helping . Pain 3/10     Nursing note reviewed and verified.    Patient was last seen on 3/13/2024.  He has been doing the PT with [facility] and this has been helping him.  He is doing the HEP and he is not using the rib brace except in rare occasions. The pain is between a 2-3/10.  The HEP can make it more achy.  The PT sessions are getting spread out.  He has to concentrate at time to feel the pain more often now.  The pain is in the left anterior and posterior rib cage.  He is now overall about 50% better.  The rib is moving better but not normally.        Past Medical History   History reviewed. No pertinent past medical history.    Past Surgical History   Past Surgical History:   Procedure Laterality Date    Other  1979    Pyloric stenosis        Family History   Family History   Problem Relation Age of Onset    No Known Problems Mother     No Known Problems Father     No Known Problems Sister     Dementia Maternal Grandmother         Alzheimer's    Dementia Paternal Grandmother         Lewy Body     Colon Cancer Paternal Grandfather     Other (Parkinson's) Maternal Uncle        Social History   Social History     Socioeconomic History    Marital status:      Spouse name: Not on file    Number of children: Not on file    Years of education: Not on file    Highest education level: Not on file   Occupational History    Not on file   Tobacco Use    Smoking status: Former    Smokeless tobacco: Never   Vaping Use    Vaping status: Never Used   Substance and Sexual Activity    Alcohol use: Yes     Alcohol/week: 6.0 standard drinks of alcohol     Types: 6 Cans of beer per week     Comment: Four Guinnesses a night     Drug use: No    Sexual activity: Yes     Partners: Female      Birth control/protection: I.U.D.   Other Topics Concern    Caffeine Concern Yes    Exercise Yes    Seat Belt No    Special Diet No    Stress Concern No    Weight Concern No   Social History Narrative    Not on file     Social Determinants of Health     Financial Resource Strain: Not on file   Food Insecurity: Not on file   Transportation Needs: Not on file   Physical Activity: Not on file   Stress: Not on file   Social Connections: Not on file   Housing Stability: Not on file       PE:  The patient does appear in his stated age in no distress.  The patient is well groomed.    Psychiatric:  The patient is alert and oriented x 3.  The patient has a normal affect and mood.      Respiratory:  No acute respiratory distress. Patient does not have a cough.    HEENT:  Extraocular muscles are intact. There is no kern icterus. Pupils are equal, round, and reactive to light. No redness or discharge bilaterally.    Skin:  There are no rashes or lesions.    Vitals:  There were no vitals filed for this visit.    Cervical Spine:    Posture: normal chin forward superiorly rotated protracted shoulder posture.   Shoulders: Level   Head: In neutral     Gait:    Gait: Normal gait   Sit to Stand: no difficulty      Thoracic and Lumbar Spine:    Scoliosis: Thoracic dextroscoliosis that is mild, Lumbar levoscoliosis that is mild, and Left shift that is mild     Thoracic and Lumbar Spine Palpation:    Spinous Processes: Non-tender for all Spinous Processes   Z-joints: Non-tender for all Z-joints     Neurological Lower Extremity:    Light Touch Sensation: Light touch is intact in the bilateral thoracic dermatomes     Rib Cage:  Left 11th rib is tender to palpation anteriorly.    Assessment  1. Rib pain on left side    2. Chronic bilateral thoracic back pain    3. Shoulder weakness on left side       Plan  He will continue with the PT and the home exercise program.  He is doing well with this program now and will need to continue for 5-10  more sessions of the PT over the course of the next 3-6 months.  This is to guarantee rib stability and make it unlikely that he will have a relapse of the rib and dysfunction which could then require more aggressive interventions/procedures to be performed.    He will follow up in 6 months or sooner if needed.    The patient understands and agrees with the stated plan.  Jake Seay MD  6/12/2024

## 2024-06-12 NOTE — PATIENT INSTRUCTIONS
Plan  He will continue with the PT and the home exercise program.  He is doing well with this program now and will need to continue for 5-10 more sessions of the PT over the course of the next 3-6 months.  This is to guarantee rib stability and make it unlikely that he will have a relapse of the rib and dysfunction which could then require more aggressive interventions/procedures to be performed.    He will follow up in 6 months or sooner if needed.

## 2024-07-01 ENCOUNTER — OFFICE VISIT (OUTPATIENT)
Dept: PHYSICAL THERAPY | Facility: HOSPITAL | Age: 44
End: 2024-07-01
Attending: FAMILY MEDICINE
Payer: COMMERCIAL

## 2024-07-01 PROCEDURE — 97140 MANUAL THERAPY 1/> REGIONS: CPT | Performed by: PHYSICAL THERAPIST

## 2024-07-01 PROCEDURE — 97112 NEUROMUSCULAR REEDUCATION: CPT | Performed by: PHYSICAL THERAPIST

## 2024-07-01 NOTE — PROGRESS NOTES
7/1/2024  Attended 13 visits total  Patient Name: Ridge Ambrocio  YOB: 1980          MRN number:  [id number]  Referring Physician:  Ankita Madden    Dx:  Rib pain on left side (R07.81)  Chronic bilateral thoracic back pain (M54.6,G89.29)  Shoulder weakness (R29.898)             Authorized # of Visits:  12 visits on the POC          Next MD visit: none   Fall Risk: standard         Precautions:  general  Medication Changes since last visit?: No    Subjective: States his back pain hasn't come back.  Reports his L rib hurts more than it did at the last visit. Unsure if he is doing an exercises incorrectly at home.  Reports his resting pain level is now a 3-4/10 VAS, states he had shots of 5/10 VAS pain. Reports he thinks it's from the plank exercise..  States he doesn't have to stop his activity but his achiness has increased but now notices more pain at rest.    States he feels like he continue to need guidance with his exercises and feels like the manual therapy during PT sessions help considerably.    Pain Rating:  3-5/10 VAS     Objective:     Cervical AROM:  Pain (+/-)   Flexion 45    Extension 50    R Sidebend 30    L Sidebend 35    R Rotation 80    L Rotation 80    Protrusion WFL    Retraction WFL          NIKKI Testing:    NIKKI Testing R L   Cervical Axial Rotation - -   Apical Expansion WFL WFL   Adduction Drop Test - -   Extension Drop Test NT NT   SLR 75 75   Trunk Rotation NT NT   Posterior Mediastinum Expansion Test WFL WFL   Standing Reach Test - -   Elevated and ER Anterior Rib no no       Shoulder AROM:      R    L   Flex 175       175   Abd 170 170   ER Hand behind head Hand behind head   IR Hand to waist Hand to waist   Hort abd 45 degrees 45 degrees       Strength UE:   5/5 MMT Scale   R  L   Shoulder flex  5     5   Shoulder ext NT NT   Abduction (C5) 5 5   ER 4+ 4+   IR 5 5   Biceps (C6) 5 5   Wrist ext (C6) 5 5   Triceps (C7) 5 5   Wrist flex (C7) 5 5   EPL (C8)  5 5   Interossei  (T1) 5 5     Strength Scapular: 5/5 MMT Scale   R L   Upper trap (C4) 5 5   Rhomboids 5 4+   Mid trap 5 4+   Lower trap 5 4+   Serratus 4+ 4        5/13/2024  Visit #13 5/30/2024  Visit #14 6/7/2024  Visit #15 7/1/2024  Visit #16   Manual Therapy  STM/mobilization of the L rib cage - 8th rib STM/mobilization of the L rib cage - 8th rib STM/mobilization of the L rib cage - 8th rib   Therapeutic Exercise       Therapeutic Activity Education on options for continued rib pain management      Neuromuscular Education Resisted R glut max in standing    L SL resisted R glut max    Supine hooklying R glut max    Serratus breathing NIKKI Wall Supported Squat with Balloon     Sidelying Lateral Thorax Expansion with Arched Ishan-Barrel      Sidelying Swiss Ball with Right Apical Expansion and Balloon All 4 belly lift with R arm on box     NIKKI Wall Supported Squat with Balloon     Standing NIKKI wall squat    L rib taping   TNE Education       HEP L SL resisted R glut max    Supine hooklying R glut max NIKKI Wall Supported Squat with Balloon     Sidelying Lateral Thorax Expansion with Arched Ishan-Barrel      Sidelying Swiss Ball with Right Apical Expansion and Balloon All 4 belly lift with R arm on box NIKKI Wall Supported Squat with Balloon     Standing NIKKI wall squat    Continue with previous HEP         Assessment: Ridge Ambrocio has completed 13 visits and continues to make slow steady improvement. He no longer has posterior rib and back pain but continues to experience pain in the anterior L lower rib cage pain.  He displays increased positional fault of the L 9th rib (L 9th rib is in an externally rotated position) this date that responded well to manual therapy.  He needed VC's and manual cues to avoid excessive side bending and to keep his rib cage retracted during standing activities which he recently progressed to.  He has not returned to full work out activity and continues to be limited in lifting and carrying objects.  He will  benefit from skilled PT to return to his PLOF.  Recommend continued PT for an additional 5-10 visits.  Please refer to the MD's note/[prescription which is also recommending continued PT.  The pt is in agreement with the POC.       GOALS:  1.  The pt will be independent in their HEP.  MET 3/7/2024  2.  Centralization of symptoms to the cervical spine.  MET 3/7/2024  3.  The pt will be able to complete a modified workout program.  PROGRESSING 7/1/2024  4.  The pt will be able to sleep through the night without waking up from pain.  MET 3/7/2024  5.  The pt will report a 75% reduction in symptoms.  PROGRESSING  7/1/2024    Frequency/Duration: Patient will be seen for 1 x/week or a total of 10 visits over a 90 day period. Treatment will include: Manual Therapy; Therapeutic Exercises; Neuromuscular Re-education; Therapeutic Activity; Patient education; Home exercise program instruction; TNE Education, Modalities as needed.    Education or treatment limitation: None  Rehab Potential: good    Charges: NM 2 (23), Man2 (23) Total Timed Treatment: 46 min  Total Treatment Time: 46 min     Patient was advised of these findings, precautions, and treatment options and has agreed to actively participate in planning and for this course of care.    Thank you for your referral. Please co-sign or sign and return this letter via fax as soon as possible to [phone]. If you have any questions, please contact me at Dept: [phone].    Sincerely,  EL GONZALEZ PT    Electronically signed by therapist: EL GONZALEZ PT    Physician's certification required: Yes  I certify the need for these services furnished under this plan of treatment and while under my care.    X___________________________________________________ Date____________________    Certification From: 7/1/2024 to 9/17/24

## 2024-07-22 ENCOUNTER — APPOINTMENT (OUTPATIENT)
Dept: PHYSICAL THERAPY | Facility: HOSPITAL | Age: 44
End: 2024-07-22
Attending: FAMILY MEDICINE
Payer: COMMERCIAL

## 2024-08-06 ENCOUNTER — APPOINTMENT (OUTPATIENT)
Dept: PHYSICAL THERAPY | Facility: HOSPITAL | Age: 44
End: 2024-08-06
Attending: FAMILY MEDICINE
Payer: COMMERCIAL

## 2024-08-20 ENCOUNTER — PATIENT MESSAGE (OUTPATIENT)
Facility: CLINIC | Age: 44
End: 2024-08-20

## 2024-08-21 ENCOUNTER — NURSE TRIAGE (OUTPATIENT)
Facility: CLINIC | Age: 44
End: 2024-08-21

## 2024-08-21 NOTE — TELEPHONE ENCOUNTER
From: Ridge Ambrocio  To: Ankita Madden  Sent: 8/20/2024 8:47 PM CDT  Subject: Bellybutton Hernia     Hi Dr Madden,    I think I might have a bellybutton hernia. I have pain at my belly button in a small isolated spot and abdominal discomfort when I bend or move in certain ways.    How should we handle this?    KAYE/r,    Ridge

## 2024-08-21 NOTE — TELEPHONE ENCOUNTER
Please offer to double book tomorrow, 8/22 at 3pm. If his pain worsens or develops any obstructive symptoms such as nausea or vomiting he should go to the ER.

## 2024-08-21 NOTE — TELEPHONE ENCOUNTER
Reason for Disposition   Patient wants to be seen    Protocols used: Abdominal Pain - Male-[facility]-[place]  Action Requested: Summary for Provider     []  Critical Lab, Recommendations Needed  [x] Need Additional Advice  []   FYI    []   Need Orders  [] Need Medications Sent to Pharmacy  []  Other     SUMMARY: I think I might have a bellybutton hernia. I have pain at my belly button in a small isolated spot and abdominal discomfort when I bend or move in certain ways.    How should we handle this?   Pt reports mild abdominal discomfort beginning a few weeks ago.  Patient noticed a slight swelling over the umbilicus.  Reports the discomfort was not severe, but now is consistent.  States that it woke him up last night. No nausea, vomiting, fever.  Advised appointment. None available in [place] (either office) please advise if patient could be added to the schedule tomorrow or Friday.  Reason for call: Abdominal Pain  Onset: Data Unavailable

## 2024-08-22 ENCOUNTER — OFFICE VISIT (OUTPATIENT)
Facility: CLINIC | Age: 44
End: 2024-08-22
Payer: COMMERCIAL

## 2024-08-22 VITALS
HEART RATE: 104 BPM | BODY MASS INDEX: 22.76 KG/M2 | SYSTOLIC BLOOD PRESSURE: 130 MMHG | DIASTOLIC BLOOD PRESSURE: 82 MMHG | HEIGHT: 70 IN | WEIGHT: 159 LBS | OXYGEN SATURATION: 97 %

## 2024-08-22 DIAGNOSIS — R10.33 UMBILICAL PAIN: Primary | ICD-10-CM

## 2024-08-22 DIAGNOSIS — Z12.11 COLON CANCER SCREENING: ICD-10-CM

## 2024-08-22 PROCEDURE — 3079F DIAST BP 80-89 MM HG: CPT | Performed by: FAMILY MEDICINE

## 2024-08-22 PROCEDURE — 3075F SYST BP GE 130 - 139MM HG: CPT | Performed by: FAMILY MEDICINE

## 2024-08-22 PROCEDURE — 3008F BODY MASS INDEX DOCD: CPT | Performed by: FAMILY MEDICINE

## 2024-08-22 PROCEDURE — 99213 OFFICE O/P EST LOW 20 MIN: CPT | Performed by: FAMILY MEDICINE

## 2024-08-22 NOTE — PROGRESS NOTES
CC:    Chief Complaint   Patient presents with    Umbilical Hernia     Thinks he may have a hernia pain a few weeks ago       HPI: 44 year old male here with possible umbilical hernia.  Reports a couple of weeks ago he went on a road trip with his family, and on the first day he felt some lower abdominal and umbilical pain.   The lower abdominal pressure made him feel the urge to urinate a lot, and he did urinate more frequently for a few days.  The symptoms improved after about three days.   He had been pushing a dumpster a few days before this started so thought he had pulled something.   Then on Tuesday night the pain returned on the belly button, and feels pressure in the lower abdomen as well.  Did have more pain on Tuesday night, but it was relieved with a bowel movement.  Feels a discomfort in his abdomen now that is 3-4/10.  He slept fine last night, but felt it more when he rolled over.  Not taking anything for the pain.     ROS:  General:  No fever, no fatigue, no weight changes  Cardio:  No chest pain  Pulmonary:  No cough, no SOB  GI:  No N/V/D, lower abdominal and periumbilical discomfort, no constipation   :  No discharge, no dysuria, no polyuria, no hematuria, increased urinary urgency   Dermatologic:  No rashes    History reviewed. No pertinent past medical history.    Social History     Socioeconomic History    Marital status:      Spouse name: Not on file    Number of children: Not on file    Years of education: Not on file    Highest education level: Not on file   Occupational History    Not on file   Tobacco Use    Smoking status: Former    Smokeless tobacco: Never   Vaping Use    Vaping status: Never Used   Substance and Sexual Activity    Alcohol use: Yes     Alcohol/week: 6.0 standard drinks of alcohol     Types: 6 Cans of beer per week     Comment: Four Guinnesses a night     Drug use: No    Sexual activity: Yes     Partners: Female     Birth control/protection: I.U.D.   Other Topics  Concern    Caffeine Concern Yes    Exercise Yes    Seat Belt No    Special Diet No    Stress Concern No    Weight Concern No   Social History Narrative    Not on file     Social Determinants of Health     Financial Resource Strain: Not on file   Food Insecurity: Not on file   Transportation Needs: Not on file   Physical Activity: Not on file   Stress: Not on file   Social Connections: Not on file   Housing Stability: Not on file       No current outpatient medications on file.       Doxycycline      Vitals:   Vitals:    08/22/24 1446   BP: 130/82   Pulse: 104   SpO2: 97%   Weight: 159 lb (72.1 kg)   Height: 5' 10\" (1.778 m)       Body mass index is 22.81 kg/m².    Physical:  General:  Alert, appropriate, no acute distress   HEENT: supple, no lymphadenopathy   GI:  Soft, positive bowel sounds, umbilical and periumbilical tenderness with small umbilical mass, no guarding, no rebound  Dermatologic:  No rashes or lesions    Assessment and Plan: 44-year-old male here with umbilical pain and swelling.    1. Umbilical pain    -Likely due to umbilical hernia, and will check stat ultrasound given pain reported  - Reviewed warning signs and ED precautions, and to avoid heavy lifting and straining   - US ABDOMEN LIMITED (CPT=76705); Future    2. Colon cancer screening    - Routine screening   - Gastro GI Telephone Colon Screen; Future      Ankita Madden DO  08/22/24  3:02 PM

## 2024-08-23 ENCOUNTER — HOSPITAL ENCOUNTER (OUTPATIENT)
Dept: ULTRASOUND IMAGING | Facility: HOSPITAL | Age: 44
Discharge: HOME | End: 2024-08-23
Attending: FAMILY MEDICINE
Payer: COMMERCIAL

## 2024-08-23 DIAGNOSIS — R10.33 UMBILICAL PAIN: ICD-10-CM

## 2024-08-23 DIAGNOSIS — K42.9 UMBILICAL HERNIA WITHOUT OBSTRUCTION AND WITHOUT GANGRENE: Primary | ICD-10-CM

## 2024-08-23 PROCEDURE — 76705 ECHO EXAM OF ABDOMEN: CPT | Performed by: FAMILY MEDICINE

## 2024-08-26 ENCOUNTER — OFFICE VISIT (OUTPATIENT)
Dept: PHYSICAL THERAPY | Facility: HOSPITAL | Age: 44
End: 2024-08-26
Attending: FAMILY MEDICINE
Payer: COMMERCIAL

## 2024-08-26 PROCEDURE — 97112 NEUROMUSCULAR REEDUCATION: CPT | Performed by: PHYSICAL THERAPIST

## 2024-08-26 PROCEDURE — 97530 THERAPEUTIC ACTIVITIES: CPT | Performed by: PHYSICAL THERAPIST

## 2024-08-26 NOTE — PROGRESS NOTES
8/26/2024    Attended 14 visits total  Patient Name: Ridge Ambrocio  YOB: 1980          MRN number:  [id number]  Referring Physician:  Ankita Madden    Dx:  Rib pain on left side (R07.81)  Chronic bilateral thoracic back pain (M54.6,G89.29)  Shoulder weakness (R29.898)             Authorized # of Visits:  12 visits on the POC          Next MD visit: none   Fall Risk: standard         Precautions:  general  Medication Changes since last visit?: No    Subjective: States he down has a inter umbilicus hernia.from pushing a heavy dumpster then end of July.  Has pain so his PCP is sending him to a surgeon,.  Sees a surgeon on 9/10.  Wants to know what exercises are appropriate to do.  States he has been avoiding lifting.  States his chest wall is slightly worse because he hasn't been able to do his HEP.    Pain Rating:  3-5/10 VAS     Objective:     Cervical AROM:  Pain (+/-)   Flexion 45    Extension 50    R Sidebend 30    L Sidebend 35    R Rotation 80    L Rotation 80    Protrusion WFL    Retraction WFL          NIKKI Testing:    NIKKI Testing R L   Cervical Axial Rotation - -   Apical Expansion WFL WFL   Adduction Drop Test - -   Extension Drop Test NT NT   SLR 75 75   Trunk Rotation NT NT   Posterior Mediastinum Expansion Test WFL WFL   Standing Reach Test - -   Elevated and ER Anterior Rib no no       Shoulder AROM:      R    L   Flex 175       175   Abd 170 170   ER Hand behind head Hand behind head   IR Hand to waist Hand to waist   Hort abd 45 degrees 45 degrees       Strength UE:   5/5 MMT Scale   R  L   Shoulder flex  5     5   Shoulder ext NT NT   Abduction (C5) 5 5   ER 4+ 4+   IR 5 5   Biceps (C6) 5 5   Wrist ext (C6) 5 5   Triceps (C7) 5 5   Wrist flex (C7) 5 5   EPL (C8)  5 5   Interossei (T1) 5 5     Strength Scapular: 5/5 MMT Scale   R L   Upper trap (C4) 5 5   Rhomboids 5 4+   Mid trap 5 4+   Lower trap 5 4+   Serratus 4+ 4        5/30/2024  Visit #14 6/7/2024  Visit #15 7/1/2024  Visit  #16 8/26/2024  Visit #17   Manual Therapy STM/mobilization of the L rib cage - 8th rib STM/mobilization of the L rib cage - 8th rib STM/mobilization of the L rib cage - 8th rib    Therapeutic Exercise       Therapeutic Activity    Education on to avoid heavy lifting     Education to avoid breath holding with movement and how breath holding increased inter-abdominal pressure    Education on abdominal anatomy     Education to splint during coughing/sneezing   Neuromuscular Education NIKKI Wall Supported Squat with Balloon     Sidelying Lateral Thorax Expansion with Arched Ishan-Barrel      Sidelying Swiss Ball with Right Apical Expansion and Balloon All 4 belly lift with R arm on box     NIKKI Wall Supported Squat with Balloon     Standing NIKKI wall squat    L rib taping Abdominal approximation in supine hooklying    Abdominal approximation in supine hooklying with head lift    Supine scapular protraction    Anterior neck inhibition    Standing wall supported IO/TA           TNE Education       HEP NIKKI Wall Supported Squat with Balloon     Sidelying Lateral Thorax Expansion with Arched Ishan-Barrel      Sidelying Swiss Ball with Right Apical Expansion and Balloon All 4 belly lift with R arm on box NIKKI Wall Supported Squat with Balloon     Standing NIKKI wall squat    Continue with previous HEP Abdominal approximation in supine hooklying    Abdominal approximation in supine hooklying with head lift    Supine scapular protraction    Anterior neck inhibition         Assessment: No adverse effects to treatment.  The pt presents back to therapy with a umbilical hernia that was diagnosed after pushing a heavy dumpster.  He wax extensively educated on precautions with the hernia and to avoid any painful activity.  Taught patient to appropriate exercises and advised to avoid any breath holding.  Will await the surgeons further POC.      GOALS:  1.  The pt will be independent in their HEP.  MET 3/7/2024  2.  Centralization of symptoms  to the cervical spine.  MET 3/7/2024  3.  The pt will be able to complete a modified workout program.  PROGRESSING 7/1/2024  4.  The pt will be able to sleep through the night without waking up from pain.  MET 3/7/2024  5.  The pt will report a 75% reduction in symptoms.  PROGRESSING  7/1/2024    Frequency/Duration: Patient will be seen for 1 x/week or a total of 10 visits over a 90 day period. Treatment will include: Manual Therapy; Therapeutic Exercises; Neuromuscular Re-education; Therapeutic Activity; Patient education; Home exercise program instruction; TNE Education, Modalities as needed.    Education or treatment limitation: None  Rehab Potential: good    Charges: NM 2 (25), TA2 (25) Total Timed Treatment: 50min  Total Treatment Time: 50 min        Certification From: 7/1/2024 to 9/17/24

## 2024-08-29 ENCOUNTER — NURSE ONLY (OUTPATIENT)
Facility: CLINIC | Age: 44
End: 2024-08-29

## 2024-08-29 DIAGNOSIS — Z12.11 SCREENING FOR COLON CANCER: Primary | ICD-10-CM

## 2024-08-29 NOTE — PROGRESS NOTES
GI Staff:  TCS Colon Screening Orders    Please schedule: Colonoscopy 13822 with MAC OR IV (if appropriate)    Please send split dose Golytely bowel prep     Diagnosis: Colon Screening Z12.11   Medication adjustments: None  Day before procedure, hold:None  Day of procedure, hold: None    >>>Please inform patient if new medications are started after scheduling procedure they need to call clinic to notify us.

## 2024-08-29 NOTE — PROGRESS NOTES
Attn; PPD Prior Auth      Patient is scheduled for Colonoscopy 98963 on 9/13/2024. Please obtain prior authorization.      Thanks!

## 2024-08-29 NOTE — PROGRESS NOTES
Called patient for scheduled TCS/positive FIT result.   Medications, pharmacy, and allergies verified.   Please advise on colonoscopy and bowel prep orders.     Age 45-74 y/o (Y/N):   › GI MD preference: None  › Insurance:  [facility] HMO  › Last PCP Visit: 8/22/2024  PCP within [facility]:  › Last CBC drawn: 7/25/2023  › Date of positive FIT test: N/A  › H/W/BMI: 5'10/ 159.0Lbs/ 22.81 bmi    Special comments/notes:  Telephone Colon Screening Questionnaire Yes No   Are you currently experiencing any new/abnormal GI symptoms? [] [x]   If yes, explain:     Rectal bleeding? [] [x]   Black stool? [] [x]   Dysphagia or food \"feeling stuck\" when eating? [] [x]   Intractable vomiting? [] [x]   Unexplained weight loss? [] [x]   First colonoscopy? [x] []   Family history of colon cancer? [] [x]   Any issues with anesthesia? [] [x]   If yes, explain:      Stroke, heart attack or stent placement in the last 12 months:  [] [x]   History of  respiratory issues/oxygen/ANN/COPD: [] [x]   If yes, CPAP/BiPAP:     History of devices (pacemaker/defibrillator) [] [x]   History of cardiac/CVA/(MI/stroke): [] [x]     Medications Yes  No   Anticoagulants (except Aspirin):  [] [x]   Diabetic Meds  PO: Hold day before and day of procedure  Insulin:  [] [x]   Weight loss meds (phentermine/vyvanse/saxsenda/etc): [] [x]   Iron/herbal/multivitamin supplement: [] [x]   Usage of marijuana, CBD &/or vape products: [] [x]

## 2024-08-29 NOTE — PROGRESS NOTES
Scheduled for:  Colonoscopy 11278  Provider Name:    Date:  9/13/2024  Location:  [facility]ending [facility] to [facility]  Sedation:  MAC  Time:  7:30 am, (pt is aware that Endoscopy Desk will call the day before to confirm arrival time)  Prep:  Golytely  Meds/Allergies Reconciled?:  Physician reviewed  Diagnosis with codes:  Screening for Colon Cancer Z12.11  Was patient informed to call insurance with codes (Y/N):  Yes   Referral sent?:  Referral was sent at the time of electronic surgical scheduling.  EM or [facility] notified?:  I sent an electronic request to Endo Scheduling and received a confirmation today.  Medication Orders:  Patient is aware to NOT take iron pills, herbal meds and diet supplements for 7 days before exam. Also to NOT take any form of alcohol, recreational drugs and any forms of ED meds 24-72 hours before exam.   Misc Orders:  N/A     Further instructions given by staff: I discussed the prep instructions with the patient which he verbally understood and is aware that I will send prep instructions today via [employer].

## 2024-09-05 ENCOUNTER — APPOINTMENT (OUTPATIENT)
Dept: PHYSICAL THERAPY | Facility: HOSPITAL | Age: 44
End: 2024-09-05
Attending: FAMILY MEDICINE
Payer: COMMERCIAL

## 2024-09-09 ENCOUNTER — TELEPHONE (OUTPATIENT)
Facility: CLINIC | Age: 44
End: 2024-09-09

## 2024-09-09 NOTE — TELEPHONE ENCOUNTER
Message from PAT-    Spoke with patient today regarding procedure scheduled 9/13. Patient states that he already cancelled his procedure with the office this morning. Patient understands to call Dr. Montero's office when he is ready to reschedule.       Cancelled in epic and endo      CANCELLED  for:  Colonoscopy 82722  Provider Name:    Date:  9/13/2024  Location:  [facility]  Sedation:  MAC  Time:  7:30 am

## 2024-09-10 NOTE — TELEPHONE ENCOUNTER
Called patient - rescheduled his colonoscopy with Dr. Montero to 1/10/2025 at [facility].    Please refer to telephone encounter dated 8/29/2024 for scheduling orders.    Telephone encounter closed.

## 2024-09-11 ENCOUNTER — OFFICE VISIT (OUTPATIENT)
Facility: CLINIC | Age: 44
End: 2024-09-11
Payer: COMMERCIAL

## 2024-09-11 VITALS
WEIGHT: 155 LBS | DIASTOLIC BLOOD PRESSURE: 78 MMHG | HEIGHT: 70 IN | BODY MASS INDEX: 22.19 KG/M2 | OXYGEN SATURATION: 98 % | HEART RATE: 90 BPM | SYSTOLIC BLOOD PRESSURE: 128 MMHG

## 2024-09-11 DIAGNOSIS — Z00.00 ENCOUNTER FOR ROUTINE ADULT HEALTH EXAMINATION WITHOUT ABNORMAL FINDINGS: Primary | ICD-10-CM

## 2024-09-11 DIAGNOSIS — Z12.5 PROSTATE CANCER SCREENING: ICD-10-CM

## 2024-09-11 PROCEDURE — 3008F BODY MASS INDEX DOCD: CPT | Performed by: FAMILY MEDICINE

## 2024-09-11 PROCEDURE — 99396 PREV VISIT EST AGE 40-64: CPT | Performed by: FAMILY MEDICINE

## 2024-09-11 PROCEDURE — 3074F SYST BP LT 130 MM HG: CPT | Performed by: FAMILY MEDICINE

## 2024-09-11 PROCEDURE — 3078F DIAST BP <80 MM HG: CPT | Performed by: FAMILY MEDICINE

## 2024-09-11 NOTE — PROGRESS NOTES
Ridge Ambrocio is a 44 year old male who presents for a complete physical exam.   HPI:     Scheduled his hernia repair surgery for October 7th. Ended up having bilateral inguinal hernias as well, and will have surgery on all three.    Concerns: Still having some discomfort in the umbilical region around the hernia, and has intermittent urinary urgency.   Last colonoscopy:  Scheduled in January   Last PSA: 7/2023 and 0.92  Diet/exercise: Eats two meals a day, which is typically lunch and dinner. Will have a sandwich for lunch or dinner leftovers. Dinner is usually a protein with a vegetable and a carbohydrate. Not able to exercise due to his back pain.   Hx of STI screening: Yes, and negative  History of intimate partner violence: None  Substance abuse: None  Vaccines- Tdap: 2022, Covid: Completed 4 doses    REVIEW OF SYSTEMS:   GENERAL: feels well otherwise   SKIN: denies any unusual skin lesions  EYES:denies vision change  HEENT: no hearing changes  LUNGS: denies shortness of breath with exertion  CARDIOVASCULAR: denies chest pain on exertion  GI: denies abdominal pain, constipation or diarrhea  : denies nocturia or changes in stream  NEURO: denies headaches  PSYCHE: denies depression or anxiety  MSK: intermittent right forearm muscle tenderness     No current outpatient medications on file.      History reviewed. No pertinent past medical history.   Past Surgical History:   Procedure Laterality Date    Other  1979    Pyloric stenosis       Family History   Problem Relation Age of Onset    No Known Problems Mother     No Known Problems Father     No Known Problems Sister     Dementia Maternal Grandmother         Alzheimer's    Dementia Paternal Grandmother         Lewy Body     Colon Cancer Paternal Grandfather     Other (Parkinson's) Maternal Uncle       Social History:  Social History     Socioeconomic History    Marital status:    Tobacco Use    Smoking status: Former     Current packs/day: 0.00      Average packs/day: 1 pack/day for 15.0 years (15.0 ttl pk-yrs)     Types: Cigarettes     Quit date: 2017     Years since quittin.6    Smokeless tobacco: Never   Vaping Use    Vaping status: Never Used   Substance and Sexual Activity    Alcohol use: Yes     Alcohol/week: 18.0 standard drinks of alcohol     Types: 18 Cans of beer per week     Comment: Four Guinnesses a night     Drug use: No    Sexual activity: Yes     Partners: Female     Birth control/protection: I.U.D.   Other Topics Concern    Caffeine Concern No    Exercise No    Seat Belt No    Special Diet No    Stress Concern No    Weight Concern No      Occ: Getting his PhD in [place], and works as a  for [employer]  : Yes Children: 2        EXAM:     Wt Readings from Last 6 Encounters:   24 155 lb (70.3 kg)   24 159 lb (72.1 kg)   24 162 lb (73.5 kg)   24 162 lb (73.5 kg)   23 153 lb (69.4 kg)   22 155 lb (70.3 kg)     Body mass index is 22.24 kg/m².    /78   Pulse 90   Ht 5' 10\" (1.778 m)   Wt 155 lb (70.3 kg)   SpO2 98%   BMI 22.24 kg/m²      GENERAL: well developed, well nourished,in no apparent distress  SKIN: no rashes, no suspicious lesions  HEENT: atraumatic, normocephalic  EYES: PERRLA, EOMI  NECK: supple, no adenopathy   LUNGS: clear to auscultation  CARDIO: RRR without murmur  GI: good bowel sounds, no masses, HSM or tenderness  EXTREMITIES: no cyanosis, clubbing or edema  NEURO: Oriented times three, cranial nerves are intact, motor and sensory are grossly intact  Right Elbow Exam     Tenderness   The patient is experiencing no tenderness (Right brachioradialis).     Range of Motion   The patient has normal right elbow ROM.    Muscle Strength   The patient has normal right elbow strength.    Other   Erythema: absent  Scars: absent  Sensation: normal  Pulse: present              Cholesterol, Total (mg/dL)   Date Value   2023 181   2021 211 (H)   10/30/2018 196      HDL Cholesterol (mg/dL)   Date Value   07/25/2023 58   08/31/2021 72 (H)   10/30/2018 58     LDL Cholesterol (mg/dL)   Date Value   07/25/2023 97   08/31/2021 124 (H)   10/30/2018 113 (H)     AST (U/L)   Date Value   07/25/2023 25   08/31/2021 23   10/30/2018 27     ALT (U/L)   Date Value   07/25/2023 41   08/31/2021 39   10/30/2018 24      ASSESSMENT AND PLAN:   Ridge Ambrocio is a 44 year old male who presents for a complete physical exam.    Encounter Diagnoses   Name Primary?    Encounter for routine adult health examination without abnormal findings Yes    Prostate cancer screening      Orders Placed This Encounter   Procedures    CBC With Differential With Platelet    Comp Metabolic Panel (14)    Hemoglobin A1C    Lipid Panel    PSA (Screening) [E]       Discussed with patient the following:  -Risks and benefits of screening for prostate cancer: Check PSA   -Colon cancer screening: scheduled   -Prevention of skin cancer  -Healthy diet including adequate intake of vegetables and fruits, appropriate portion sizes, minimizing highly concentrated carbohydrate foods  -Exercising 30 minutes a day most days of the week   -Importance of regular exercise and weight maintenance  -Diabetes screening which he desires  -Cholesterol screening which he desires  -Recommendation for yearly influenza vaccine  -Need for Tdap once as an adult and Td booster every 10 years  -Need for Zoster vaccine for patients >= 50   -Contraception: partner methods, condoms, vasectomy  -STI screening (GC/Chlamydia/HIV): not indicated  -Hepatitis C screening for all adults between the ages of 18 and 79: Checked and negative     Meds & Refills for this Visit:  Requested Prescriptions      No prescriptions requested or ordered in this encounter       Imaging & Consults:  None    Return in 1 year for annual physical or sooner as needed.     Ankita Madden DO  09/11/24   1:34 PM

## 2024-09-16 ENCOUNTER — TELEPHONE (OUTPATIENT)
Dept: PHYSICAL THERAPY | Facility: HOSPITAL | Age: 44
End: 2024-09-16

## 2024-09-19 ENCOUNTER — APPOINTMENT (OUTPATIENT)
Dept: PHYSICAL THERAPY | Facility: HOSPITAL | Age: 44
End: 2024-09-19
Attending: FAMILY MEDICINE
Payer: COMMERCIAL

## 2024-09-27 ENCOUNTER — APPOINTMENT (OUTPATIENT)
Dept: PHYSICAL THERAPY | Facility: HOSPITAL | Age: 44
End: 2024-09-27
Attending: FAMILY MEDICINE
Payer: COMMERCIAL

## 2024-10-01 ENCOUNTER — APPOINTMENT (OUTPATIENT)
Dept: PHYSICAL THERAPY | Facility: HOSPITAL | Age: 44
End: 2024-10-01
Attending: FAMILY MEDICINE
Payer: COMMERCIAL

## 2024-10-07 PROBLEM — K40.20 NON-RECURRENT BILATERAL INGUINAL HERNIA WITHOUT OBSTRUCTION OR GANGRENE: Status: ACTIVE | Noted: 2024-10-07

## 2024-10-19 ENCOUNTER — HOSPITAL ENCOUNTER (OUTPATIENT)
Age: 44
Discharge: HOME | End: 2024-10-19
Payer: COMMERCIAL

## 2024-10-19 VITALS
SYSTOLIC BLOOD PRESSURE: 153 MMHG | DIASTOLIC BLOOD PRESSURE: 96 MMHG | RESPIRATION RATE: 20 BRPM | HEART RATE: 75 BPM | OXYGEN SATURATION: 100 % | TEMPERATURE: 98 F

## 2024-10-19 DIAGNOSIS — L08.9 WOUND INFECTION: Primary | ICD-10-CM

## 2024-10-19 DIAGNOSIS — T14.8XXA WOUND INFECTION: Primary | ICD-10-CM

## 2024-10-19 PROCEDURE — 99213 OFFICE O/P EST LOW 20 MIN: CPT | Performed by: NURSE PRACTITIONER

## 2024-10-19 RX ORDER — CEPHALEXIN 500 MG/1
500 CAPSULE ORAL 4 TIMES DAILY
Qty: 28 CAPSULE | Refills: 0 | Status: SHIPPED | OUTPATIENT
Start: 2024-10-19 | End: 2024-10-26

## 2024-10-19 NOTE — ED PROVIDER NOTES
Patient Seen in: [facility]      History     Chief Complaint   Patient presents with    Wound Care     Had laparoscopic hernia repair 12 days ago. One incision has some drainage. - Entered by patient     Stated Complaint: Wound Care - Had laparoscopic hernia repair 12 days ago. One incision has some *    Subjective:   43 y/o male with recent laparoscopic hernia repair 12 days ago presents with c/o redness and mild drainage from umbilical site onset yesterday. No fever/chills, pain, swelling to abdomen              Objective:     No pertinent past medical history.            No pertinent past surgical history.              No pertinent social history.            Review of Systems   Constitutional:  Negative for chills and fever.   Gastrointestinal:  Negative for abdominal pain, nausea and vomiting.   Skin:  Positive for wound.   All other systems reviewed and are negative.      Positive for stated complaint: Wound Care - Had laparoscopic hernia repair 12 days ago. One incision has some *  Other systems are as noted in HPI.  Constitutional and vital signs reviewed.      All other systems reviewed and negative except as noted above.    Physical Exam     ED Triage Vitals [10/19/24 0903]   BP (!) 153/96   Pulse 75   Resp 20   Temp 98 °F (36.7 °C)   Temp src Temporal   SpO2 100 %   O2 Device None (Room air)       Current Vitals:   Vital Signs  BP: (!) 153/96  Pulse: 75  Resp: 20  Temp: 98 °F (36.7 °C)  Temp src: Temporal    Oxygen Therapy  SpO2: 100 %  O2 Device: None (Room air)        Physical Exam  Vitals and nursing note reviewed.   Constitutional:       General: He is not in acute distress.     Appearance: Normal appearance. He is not ill-appearing.   Cardiovascular:      Rate and Rhythm: Normal rate and regular rhythm.   Abdominal:      General: Bowel sounds are normal.      Palpations: Abdomen is soft.      Tenderness: There is no abdominal tenderness.       Musculoskeletal:         General: Normal  range of motion.   Skin:     General: Skin is warm and dry.      Capillary Refill: Capillary refill takes less than 2 seconds.   Neurological:      Mental Status: He is alert and oriented to person, place, and time.   Psychiatric:         Behavior: Behavior is cooperative.             ED Course   Labs Reviewed - No data to display                MDM              Medical Decision Making  Patient is well-appearing.  I discussed differentials with patient including but not limited to cellulitis vs abscess  Keep area clean and dry  Per patient was told not to remove attached portion of steri strip  RX Cephalexin  Keep your appointment on Tuesday with Surgeon. Return/ ED precautions discussed      Problems Addressed:  Wound infection: acute illness or injury        Disposition and Plan     Clinical Impression:  1. Wound infection         Disposition:  Discharge  10/19/2024  9:37 am    Follow-up:  Seamus Dickens PA  [address] [address]  [phone]      keep your up coming appointment          Medications Prescribed:  Discharge Medication List as of 10/19/2024  9:43 AM        START taking these medications    Details   cephALEXin 500 MG Oral Cap Take 1 capsule (500 mg total) by mouth 4 (four) times daily for 7 days., Normal, Disp-28 capsule, R-0                 Supplementary Documentation:

## 2024-11-18 ENCOUNTER — APPOINTMENT (OUTPATIENT)
Dept: PHYSICAL THERAPY | Facility: HOSPITAL | Age: 44
End: 2024-11-18
Attending: SURGERY
Payer: COMMERCIAL

## 2024-11-19 ENCOUNTER — TELEPHONE (OUTPATIENT)
Dept: PHYSICAL THERAPY | Facility: HOSPITAL | Age: 44
End: 2024-11-19

## 2024-11-25 ENCOUNTER — APPOINTMENT (OUTPATIENT)
Dept: PHYSICAL THERAPY | Facility: HOSPITAL | Age: 44
End: 2024-11-25
Attending: SURGERY
Payer: COMMERCIAL

## 2024-12-02 ENCOUNTER — APPOINTMENT (OUTPATIENT)
Dept: PHYSICAL THERAPY | Facility: HOSPITAL | Age: 44
End: 2024-12-02
Attending: SURGERY
Payer: COMMERCIAL

## 2024-12-09 ENCOUNTER — APPOINTMENT (OUTPATIENT)
Dept: PHYSICAL THERAPY | Facility: HOSPITAL | Age: 44
End: 2024-12-09
Attending: SURGERY
Payer: COMMERCIAL

## 2024-12-16 ENCOUNTER — APPOINTMENT (OUTPATIENT)
Dept: PHYSICAL THERAPY | Facility: HOSPITAL | Age: 44
End: 2024-12-16
Attending: SURGERY
Payer: COMMERCIAL

## 2024-12-23 ENCOUNTER — APPOINTMENT (OUTPATIENT)
Dept: PHYSICAL THERAPY | Facility: HOSPITAL | Age: 44
End: 2024-12-23
Attending: SURGERY
Payer: COMMERCIAL

## 2025-01-02 ENCOUNTER — TELEPHONE (OUTPATIENT)
Dept: PHYSICAL THERAPY | Facility: HOSPITAL | Age: 45
End: 2025-01-02

## 2025-01-03 ENCOUNTER — OFFICE VISIT (OUTPATIENT)
Dept: PHYSICAL THERAPY | Facility: HOSPITAL | Age: 45
End: 2025-01-03
Attending: PHYSICAL MEDICINE & REHABILITATION
Payer: COMMERCIAL

## 2025-01-03 PROCEDURE — 97110 THERAPEUTIC EXERCISES: CPT | Performed by: PHYSICAL THERAPIST

## 2025-01-03 PROCEDURE — 97112 NEUROMUSCULAR REEDUCATION: CPT | Performed by: PHYSICAL THERAPIST

## 2025-01-03 NOTE — PROGRESS NOTES
1/3/2025  Patient Name: Ridge Ambrocio  YOB: 1980          MRN number:  [id number]  Referring Physician:  Ankita Madden      RE-EVALUATION/PRGORESS NOTE (Post  Hernia Repair)      Dx:  Rib pain on left side (R07.81)  Chronic bilateral thoracic back pain (M54.6,G89.29)  Shoulder weakness (R29.898)             Authorized # of Visits:  5 visits approved 1/1/25 yo 4/1/25        Next MD visit: none   Fall Risk: standard         Precautions:  general  Medication Changes since last visit?: No    Subjective: States he was released from his hernia surgery Oct 7th, 2924  (see release from surgeon's office from PA in the chart).  States she has different sensations in his abdomin after his hernia surgery )had umbilical and inguinal bilaterally).  Unsure if he has mesh across his stomach.    Wants to get back into therapy to relief his pain.    Pain Rating:  3/10 VAS currently, 5-6/10 VAS at the worse    Objective:     Cervical AROM: 1/3/2025   Flexion 45   Extension 50   R Sidebend 30   L Sidebend 35   R Rotation 70   L Rotation 60   Protrusion WFL   Retraction WFL         NIKKI Testing:    NIKKI Testing R L   Cervical Axial Rotation - -   Apical Expansion decreased WFL   Adduction Drop Test - -   Extension Drop Test NT NT   SLR 75 75   Trunk Rotation NT NT   Posterior Mediastinum Expansion Test WFL WFL   Standing Reach Test - -   Elevated and ER Anterior Rib no no       Shoulder AROM:      R    L   Flex 160    60   Abd 165 165   ER Hand behind head Hand behind head   IR Hand to waist Hand to waist   Hort abd 45 degrees 45 degrees       Strength UE:   5/5 MMT Scale   R  L   Shoulder flex  5     5   Shoulder ext NT NT   Abduction (C5) 5 5   ER 4+ 4+   IR 5 5   Biceps (C6) 5 5   Wrist ext (C6) 5 5   Triceps (C7) 5 5   Wrist flex (C7) 5 5   EPL (C8)  5 5   Interossei (T1) 5 5     Strength Scapular: 5/5 MMT Scale   R L   Upper trap (C4) 5 5   Rhomboids 5 4+   Mid trap 5 4+   Lower trap 5 4+   Serratus 4+ 4      Palpation:  Note L mid thoracic and lower rib flare with TTP of the L intercostals     1/3/2025  Visit #1   Manual Therapy    Therapeutic Exercise Re-assessment   Therapeutic Activity    Neuromuscular Education Supine hoolkying IO//TA    Supine hooklying scapular protraction    Supine weighted triceps curls    Supine positional lift    Supine hooklying resisted R glut max   TNE Education    HEP upine hoolkying IO//TA    Supine hooklying scapular protraction    Supine weighted triceps curls    Supine positional lift    Supine hooklying resisted R glut max         Assessment:  Ridge Ambrocio presents back to PT a after abdominal surgery for a hernia repair Oct 7th, 2004 (umbilical and bilateral inguinal hernias).  He is not 12 weeks post and has been released by the surgeon's PA to resume full activity (see note in the chart).  The pt reports different sensation in his abdomin since surgery.  States he has more rib pain that when he stopping PT.  Reports his pain is more constant and severe since stopping PT.  Reports 5-6/10 VAS at the worst, 3/10 VAS currently.  States he has difficutly sleeping on his L side.  Also feels pain with sitting and driving. He has not been able to resume his previous HEP since surgery.      He displays L mid and lower thoracic rib flare with TTP of the intercostals L.  He also displays decreased cervical spine and shoulder ROM since surgery and displays decreased expansion of the chest wall.  He will benefit from skilled PT to decrease his pain, improve his ability to expand his chest wall, improve his core control and relieve his pain.  Recommend skilled PT 1 time per week for up to 10 visits and then re-evaluate at that time.  The pt is in agreement with the POC.      GOALS:  1.  The pt will be independent in their HEP.  MET 3/7/2024  2.  Centralization of symptoms to the cervical spine.  MET 3/7/2024  3.  The pt will be able to complete a modified workout program.  PROGRESSING  1/3/2025  4.  The pt will be able to sleep through the night without waking up from pain.  MET 3/7/2024  5.  The pt will report a 75% reduction in symptoms.  PROGRESSING  1/3/2025  6.  The pt will be able to sit though his work day without pain.  Added 1/3/2025  7.  The pt will be able to play baseball without his kids.  Added 1/3/2025  8.  The pt will be able to ice skate with his children.  Added 1/3/2025      Frequency/Duration: Patient will be seen for 1 x/week or a total of 10 visits over a 90 day period. Treatment will include: Manual Therapy; Therapeutic Exercises; Neuromuscular Re-education; Therapeutic Activity; Patient education; Home exercise program instruction; TNE Education, Modalities as needed.    Education or treatment limitation: None  Rehab Potential: good    Charges: TE1(10), NM3(38) Total Timed Treatment: 48 in  Total Treatment Time: 48min         Patient was advised of these findings, precautions, and treatment options and has agreed to actively participate in planning and for this course of care.    Thank you for your referral. Please co-sign or sign and return this letter via fax as soon as possible to [phone]. If you have any questions, please contact me at Dept: [phone].    Sincerely,  EL GONZALEZ PT    Electronically signed by therapist: EL GONZALEZ PT    Physician's certification required: Yes  I certify the need for these services furnished under this plan of treatment and while under my care.    X___________________________________________________ Date____________________    Certification From: 1/3/2025  To:4/3/2025

## 2025-01-06 RX ORDER — SODIUM CHLORIDE, SODIUM LACTATE, POTASSIUM CHLORIDE, CALCIUM CHLORIDE 600; 310; 30; 20 MG/100ML; MG/100ML; MG/100ML; MG/100ML
INJECTION, SOLUTION INTRAVENOUS CONTINUOUS
OUTPATIENT
Start: 2025-01-06

## 2025-01-08 ENCOUNTER — PATIENT MESSAGE (OUTPATIENT)
Facility: CLINIC | Age: 45
End: 2025-01-08

## 2025-01-09 ENCOUNTER — TELEPHONE (OUTPATIENT)
Facility: CLINIC | Age: 45
End: 2025-01-09

## 2025-01-09 NOTE — TELEPHONE ENCOUNTER
Spoke with referral department.     In network providers/facility do not require authorization/referral. The referral is updated to reflect this information.     Contacted patient to inform him and he was appreciative of phone call.

## 2025-01-09 NOTE — TELEPHONE ENCOUNTER
Patient calling,verified name and date of birth.  Patient calling to ask if his colonoscopy scheduled tomorrow has been approved by his insurance plan? Warm transferred patient to gastroenterology office.

## 2025-01-09 NOTE — TELEPHONE ENCOUNTER
Also see 1/8/2025 Register My InfoÂ® Message - patient checking to see if RN obtained Prior Auth for 1/10/2025 colonoscopy.  Please call.  Thank you.

## 2025-01-09 NOTE — TELEPHONE ENCOUNTER
[facility] called stating patient called them asking about referral/approval for procedure.     Patient has HMO plan.     Managed care - patient is scheduled for tomorrow? Is there is any update on this referral?

## 2025-01-09 NOTE — TELEPHONE ENCOUNTER
Contacted and spoke with patient. Discussed with him the situation and let him choose to wait for the approval or reschedule.    He decided to stay on the schedule and wait until the morning. He is aware he will need to prep still and if by tomorrow it is still not approved, he is ok with rescheduling.

## 2025-01-10 ENCOUNTER — HOSPITAL ENCOUNTER (OUTPATIENT)
Age: 45
Setting detail: HOSPITAL OUTPATIENT SURGERY
Discharge: HOME | End: 2025-01-10
Attending: INTERNAL MEDICINE | Admitting: INTERNAL MEDICINE
Payer: COMMERCIAL

## 2025-01-10 ENCOUNTER — ANESTHESIA EVENT (OUTPATIENT)
Dept: ENDOSCOPY | Age: 45
End: 2025-01-10
Payer: COMMERCIAL

## 2025-01-10 ENCOUNTER — ANESTHESIA (OUTPATIENT)
Dept: ENDOSCOPY | Age: 45
End: 2025-01-10
Payer: COMMERCIAL

## 2025-01-10 VITALS
HEIGHT: 70 IN | SYSTOLIC BLOOD PRESSURE: 121 MMHG | RESPIRATION RATE: 8 BRPM | WEIGHT: 155 LBS | BODY MASS INDEX: 22.19 KG/M2 | HEART RATE: 56 BPM | OXYGEN SATURATION: 97 % | DIASTOLIC BLOOD PRESSURE: 82 MMHG

## 2025-01-10 DIAGNOSIS — Z12.11 SCREENING FOR COLON CANCER: ICD-10-CM

## 2025-01-10 PROBLEM — K63.5 POLYP OF COLON: Status: ACTIVE | Noted: 2025-01-10

## 2025-01-10 PROBLEM — K64.8 INTERNAL HEMORRHOIDS: Status: ACTIVE | Noted: 2025-01-10

## 2025-01-10 PROCEDURE — 88305 TISSUE EXAM BY PATHOLOGIST: CPT | Performed by: INTERNAL MEDICINE

## 2025-01-10 PROCEDURE — 45385 COLONOSCOPY W/LESION REMOVAL: CPT | Performed by: INTERNAL MEDICINE

## 2025-01-10 PROCEDURE — 99070 SPECIAL SUPPLIES PHYS/QHP: CPT | Performed by: INTERNAL MEDICINE

## 2025-01-10 RX ORDER — LIDOCAINE HYDROCHLORIDE 10 MG/ML
INJECTION, SOLUTION EPIDURAL; INFILTRATION; INTRACAUDAL; PERINEURAL AS NEEDED
Status: DISCONTINUED | OUTPATIENT
Start: 2025-01-10 | End: 2025-01-10 | Stop reason: SURG

## 2025-01-10 RX ORDER — SODIUM CHLORIDE, SODIUM LACTATE, POTASSIUM CHLORIDE, CALCIUM CHLORIDE 600; 310; 30; 20 MG/100ML; MG/100ML; MG/100ML; MG/100ML
INJECTION, SOLUTION INTRAVENOUS CONTINUOUS
Status: DISCONTINUED | OUTPATIENT
Start: 2025-01-10 | End: 2025-01-10

## 2025-01-10 RX ORDER — NALOXONE HYDROCHLORIDE 0.4 MG/ML
0.08 INJECTION, SOLUTION INTRAMUSCULAR; INTRAVENOUS; SUBCUTANEOUS ONCE AS NEEDED
Status: DISCONTINUED | OUTPATIENT
Start: 2025-01-10 | End: 2025-01-10

## 2025-01-10 RX ORDER — SODIUM CHLORIDE, SODIUM LACTATE, POTASSIUM CHLORIDE, CALCIUM CHLORIDE 600; 310; 30; 20 MG/100ML; MG/100ML; MG/100ML; MG/100ML
INJECTION, SOLUTION INTRAVENOUS CONTINUOUS PRN
Status: DISCONTINUED | OUTPATIENT
Start: 2025-01-10 | End: 2025-01-10 | Stop reason: SURG

## 2025-01-10 RX ADMIN — SODIUM CHLORIDE, SODIUM LACTATE, POTASSIUM CHLORIDE, CALCIUM CHLORIDE: 600; 310; 30; 20 INJECTION, SOLUTION INTRAVENOUS at 10:50:00

## 2025-01-10 RX ADMIN — LIDOCAINE HYDROCHLORIDE 25 MG: 10 INJECTION, SOLUTION EPIDURAL; INFILTRATION; INTRACAUDAL; PERINEURAL at 10:28:00

## 2025-01-10 RX ADMIN — SODIUM CHLORIDE, SODIUM LACTATE, POTASSIUM CHLORIDE, CALCIUM CHLORIDE: 600; 310; 30; 20 INJECTION, SOLUTION INTRAVENOUS at 10:26:00

## 2025-01-10 NOTE — DISCHARGE INSTRUCTIONS
Home Care Instructions for Colonoscopy with Sedation    Diet:  - Resume your regular diet as tolerated unless otherwise instructed.  - Start with light meals to minimize bloating.  - Do not drink alcohol today.    Medication:  - If you have questions about resuming your normal medications, please contact your Primary Care Physician.    Activities:  - Take it easy today. Do not return to work today.  - Do not drive today.  - Do not operate any machinery today (including kitchen equipment).    Colonoscopy:  - You may notice some rectal \"spotting\" (a little blood on the toilet tissue) for a day or two after the exam. This is normal.  - If you experience any rectal bleeding (not spotting), persistent tenderness or sharp severe abdominal pains, oral temperature over 100 degrees Fahrenheit, light-headedness or dizziness, or any other problems, contact your doctor.      **If unable to reach your doctor, please go to the [facility] Emergency Room**    - Your referring physician will receive a full report of your examination.  - If you do not hear from your doctor's office within two weeks of your biopsy, please call them for your results.    You may be able to see your laboratory results in [employer] between 4 and 7 business days.  In some cases, your physician may not have viewed the results before they are released to [employer].  If you have questions regarding your results contact the physician who ordered the test/exam by phone or via [employer] by choosing \"Ask a Medical Question.\"

## 2025-01-10 NOTE — OPERATIVE REPORT
COLONOSCOPY REPORT    Ridge Ambrocio     1980 Age 44 year old   PCP Ankita Madden DO Endoscopist Maria L Montero MD       Date of procedure: 01/10/25    Procedure: Colonoscopy w/ cold snare polypectomy     Pre-operative diagnosis: CRC screening     Post-operative diagnosis: colon polyps, hemorrhoids     Medications: MAC    Withdrawal time: 15 minutes    Procedure:  Informed consent was obtained from the patient after the risks of the procedure were discussed, including but not limited to bleeding, perforation, aspiration, infection, or possibility of a missed lesion. After discussions of the risks/benefits and alternatives to this procedure, as well as the planned sedation, the patient was placed in the left lateral decubitus position and begun on continuous blood pressure pulse oximetry and EKG monitoring and this was maintained throughout the procedure. Once an adequate level of sedation was obtained a digital rectal exam was completed. Then the lubricated tip of the [id number] diagnostic video colonoscope was inserted and advanced without difficulty to the cecum using water immersion and CO2 insufflation technique. The cecum was identified by localizing the trifold, the appendix and the ileocecal valve. Withdrawal was begun with thorough washing and careful examination of the colonic walls and folds. A routine second examination of the cecum/ascending colon was performed. Photodocumentation was obtained. The bowel prep was excellent. Views of the colon were excellent with washing. I then carefully withdrew the instrument from the patient who tolerated the procedure well.     Complications: none.    Findings:   1. Two polyps noted as follows:      A. 4 mm polyp in the ascending colon; sessile morphology; cold snare polypectomy performed, polyp retrieved.      B. 3 mm polyp in the sigmoid colon; sessile morphology; cold snare polypectomy performed, polyp retrieved.    2. Diverticulosis:  none.    3. Ileocecal valve appeared healthy and normal.    4. The colonic mucosa throughout the colon showed normal vascular pattern, without evidence of angioectasias or inflammation.     5. A retroflexed view of the rectum revealed small internal hemorrhoids.    6. CONNER: normal rectal tone, no masses palpated.     Impression:   Two small (< 5 mm) polyps removed.   Small internal hemorrhoids.   The colon was otherwise normal with glistening mucosa and intact vascular pattern.    Recommend:  Await pathology. The interval for the next colonoscopy will be determined after reviewing pathology. If new signs or symptoms develop, colonoscopy may need to be repeated sooner.   High fiber diet.  Monitor for blood in the stool. If having more than just tinge of blood, call office or go to the ER.  Avoid NSAIDs (motrin, ibuprofen, aleve, advil, naproxen, midol, naprosyn, excedrin) for 14 days.     >>>If tissue was obtained and you have not received your pathology results either by phone or letter within 2 weeks, please call our office at [phone].    Specimens: colon polyps     Blood loss: <1 ml

## 2025-01-10 NOTE — ANESTHESIA PREPROCEDURE EVALUATION
Anesthesia PreOp Note    HPI:     Ridge Ambrocio is a 44 year old male who presents for preoperative consultation requested by: Maria L Montero MD    Date of Surgery: 1/10/2025    Procedure(s):  COLONOSCOPY  Indication: Screening for colon cancer    Relevant Problems   No relevant active problems       NPO:                         History Review:  Patient Active Problem List    Diagnosis Date Noted    Non-recurrent bilateral inguinal hernia without obstruction or gangrene 10/07/2024    Chronic bilateral thoracic back pain 2023    Shoulder weakness on left side 2023    Rib pain on left side 2023       Past Medical History:    Back problem       Past Surgical History:   Procedure Laterality Date    Hernia surgery  10/07/2024    Other      Pyloric stenosis        Prescriptions Prior to Admission[1]  Current Medications and Prescriptions Ordered in Epic[2]    Allergies[3]    Family History   Problem Relation Age of Onset    No Known Problems Mother     No Known Problems Father     No Known Problems Sister     Dementia Maternal Grandmother         Alzheimer's    Dementia Paternal Grandmother         Lewy Body     Colon Cancer Paternal Grandfather     Other (Parkinson's) Maternal Uncle      Social History     Socioeconomic History    Marital status:    Tobacco Use    Smoking status: Former     Current packs/day: 0.00     Average packs/day: 1 pack/day for 15.0 years (15.0 ttl pk-yrs)     Types: Cigarettes     Quit date: 2017     Years since quittin.0    Smokeless tobacco: Never   Vaping Use    Vaping status: Never Used   Substance and Sexual Activity    Alcohol use: Not Currently     Alcohol/week: 18.0 standard drinks of alcohol     Types: 18 Cans of beer per week     Comment: Four Guinnesses a night     Drug use: No    Sexual activity: Yes     Partners: Female   Other Topics Concern    Caffeine Concern No    Exercise No    Seat Belt No    Special Diet No    Stress  Concern No    Weight Concern No       Available pre-op labs reviewed.             Vital Signs:  Body mass index is 22.24 kg/m².   height is 1.778 m (5' 10\") and weight is 70.3 kg (155 lb).   Vitals:    25 1242   Weight: 70.3 kg (155 lb)   Height: 1.778 m (5' 10\")        Anesthesia Evaluation     Patient summary reviewed and Nursing notes reviewed    No history of anesthetic complications   Airway   Mallampati: II  Neck ROM: full  Dental      Pulmonary - negative ROS and normal exam   Cardiovascular - negative ROS and normal exam    Neuro/Psych - negative ROS     GI/Hepatic/Renal - negative ROS     Endo/Other - negative ROS   Abdominal  - normal exam                 Anesthesia Plan:   ASA:  2  Plan:   MAC  Informed Consent Plan and Risks Discussed With:  Patient      I have informed Ridge Ambrocio and/or legal guardian or family member of the nature of the anesthetic plan, benefits, risks including possible dental damage if relevant, major complications, and any alternative forms of anesthetic management.   All of the patient's questions were answered to the best of my ability. The patient desires the anesthetic management as planned.  Richy Rudolph MD  1/10/2025 9:42 AM  Present on Admission:  **None**           [1]   Medications Prior to Admission   Medication Sig Dispense Refill Last Dose/Taking    [] cephALEXin 500 MG Oral Cap Take 1 capsule (500 mg total) by mouth 4 (four) times daily for 7 days. (Patient not taking: Reported on 2025) 28 capsule 0 Not Taking    traMADol 50 MG Oral Tab Take 1 tablet (50 mg total) by mouth every 6 (six) hours as needed for Pain. (Patient not taking: Reported on 2025) 10 tablet 1 Not Taking   [2]   No current Epic-ordered facility-administered medications on file.     No current Epic-ordered outpatient medications on file.   [3]   Allergies  Allergen Reactions    Doxycycline OTHER (SEE COMMENTS)     Abdominal pressure

## 2025-01-10 NOTE — H&P
History & Physical Examination    Patient Name: Ridge Ambrocio  MRN: [id number]  [facility]: [id number]  YOB: 1980    Diagnosis: screening for colon cancer    Prescriptions Prior to Admission[1]  No current facility-administered medications for this encounter.       Allergies: Allergies[2]    Past Medical History:    Back problem     Past Surgical History:   Procedure Laterality Date    Hernia surgery  10/07/2024    Other      Pyloric stenosis      Family History   Problem Relation Age of Onset    No Known Problems Mother     No Known Problems Father     No Known Problems Sister     Dementia Maternal Grandmother         Alzheimer's    Dementia Paternal Grandmother         Lewy Body     Colon Cancer Paternal Grandfather     Other (Parkinson's) Maternal Uncle      Social History     Tobacco Use    Smoking status: Former     Current packs/day: 0.00     Average packs/day: 1 pack/day for 15.0 years (15.0 ttl pk-yrs)     Types: Cigarettes     Quit date: 2017     Years since quittin.0    Smokeless tobacco: Never   Substance Use Topics    Alcohol use: Yes     Alcohol/week: 18.0 standard drinks of alcohol     Types: 18 Cans of beer per week     Comment: Four Guinnesses a night        SYSTEM Check if Review is Normal Check if Physical Exam is Normal If not normal, please explain:   HEENT [X ] [ X]    NECK  [X ] [ X]    HEART [X ] [ X]    LUNGS [X ] [ X]    ABDOMEN [X ] [ X]    EXTREMITIES [X ] [ X]    OTHER        I have discussed the risks and benefits and alternatives of the procedure with the patient/family.  They understand and agree to proceed with plan of care.   I have reviewed the History and Physical done within the last 30 days.  Any changes noted above.    Maria L Montero MD  [facility]                   [1]   Medications Prior to Admission   Medication Sig Dispense Refill Last Dose/Taking    [] cephALEXin 500 MG Oral Cap Take 1 capsule (500 mg total) by  mouth 4 (four) times daily for 7 days. (Patient not taking: Reported on 1/6/2025) 28 capsule 0 Not Taking   [2]   Allergies  Allergen Reactions    Doxycycline OTHER (SEE COMMENTS)     Abdominal pressure

## 2025-01-10 NOTE — ANESTHESIA POSTPROCEDURE EVALUATION
Patient: Ridge Ambrocio    Procedure Summary       Date: 01/10/25 Room / Location: [facility] ENDOSCOPY 01 / [facility] ENDO    Anesthesia Start: 1026 Anesthesia Stop: 1053    Procedure: COLONOSCOPY Diagnosis:       Screening for colon cancer      (colon polyps, hemorrhoids)    Surgeons: Maria L Montero MD Anesthesiologist: Richy Rudolph MD    Anesthesia Type: MAC ASA Status: 2            Anesthesia Type: MAC    Vitals Value Taken Time   /77 01/10/25 1053   Temp 98 01/10/25 1053   Pulse 77 01/10/25 1053   Resp 16 01/10/25 1053   SpO2 98 01/10/25 1053       [facility] AN Post Evaluation:   Patient Evaluated in PACU  Patient Participation: complete - patient participated  Level of Consciousness: awake  Pain Management: adequate  Airway Patency:patent  Dental exam unchanged from preop  Yes    Cardiovascular Status: acceptable  Respiratory Status: acceptable  Postoperative Hydration acceptable      Richy Rudolph MD  1/10/2025 10:53 AM

## 2025-01-14 ENCOUNTER — TELEPHONE (OUTPATIENT)
Facility: CLINIC | Age: 45
End: 2025-01-14

## 2025-01-14 NOTE — TELEPHONE ENCOUNTER
----- Message from Maria L Montero sent at 1/13/2025  6:27 PM CST -----  GI Staff:    Can you please place recall for this patient to have a colonoscopy in 5 years.    Thank you,  Maria L

## 2025-01-14 NOTE — PROGRESS NOTES
GI Staff:    Can you please place recall for this patient to have a colonoscopy in 5 years.    Thank you,  Maria L   Laparoscopic cholecystectomy. Intraoperative cholangiograms.

## 2025-01-14 NOTE — TELEPHONE ENCOUNTER
Recall colonoscopy in 10 years per Dr Montero.    Colon done 01/10/2025    Health maintenance updated and message sent to patient outreach to repeat colonoscopy in 10 years    Results seen by patient via mychart  Seen by patient Ridge Ambrocio on 1/13/2025  6:53 PM

## 2025-01-20 ENCOUNTER — OFFICE VISIT (OUTPATIENT)
Dept: PHYSICAL THERAPY | Facility: HOSPITAL | Age: 45
End: 2025-01-20
Attending: PHYSICAL MEDICINE & REHABILITATION
Payer: COMMERCIAL

## 2025-01-20 PROCEDURE — 97112 NEUROMUSCULAR REEDUCATION: CPT | Performed by: PHYSICAL THERAPIST

## 2025-01-20 PROCEDURE — 97110 THERAPEUTIC EXERCISES: CPT | Performed by: PHYSICAL THERAPIST

## 2025-01-20 NOTE — PROGRESS NOTES
1/20/2025    Patient Name: Ridge Ambrocio  YOB: 1980          MRN number:  [id number]  Referring Physician:  Ankita Madden      Dx:  Rib pain on left side (R07.81)  Chronic bilateral thoracic back pain (M54.6,G89.29)  Shoulder weakness (R29.898)             Authorized # of Visits:  5 visits approved 1/1/25 yo 4/1/25        Next MD visit: none   Fall Risk: standard         Precautions:  general  Medication Changes since last visit?: No    Subjective: States he was a little sore since the last visit.  Reports he took a couple of days off of exercise secondary to discomfort in the abdomin.  States he do some lifting of hockey bags and felt he might have twisted.  States he was sore after that.      Pain Rating:  3/10 VAS currently, 5-6/10 VAS at the worse    Objective:     Cervical AROM: 1/3/2025   Flexion 45   Extension 50   R Sidebend 30   L Sidebend 35   R Rotation 70   L Rotation 60   Protrusion WFL   Retraction WFL         NIKKI Testing:    NKIKI Testing R L   Cervical Axial Rotation - -   Apical Expansion decreased WFL   Adduction Drop Test - -   Extension Drop Test NT NT   SLR 75 75   Trunk Rotation NT NT   Posterior Mediastinum Expansion Test WFL WFL   Standing Reach Test - -   Elevated and ER Anterior Rib no no       Shoulder AROM:      R    L   Flex 160    60   Abd 165 165   ER Hand behind head Hand behind head   IR Hand to waist Hand to waist   Hort abd 45 degrees 45 degrees       Strength UE:   5/5 MMT Scale   R  L   Shoulder flex  5     5   Shoulder ext NT NT   Abduction (C5) 5 5   ER 4+ 4+   IR 5 5   Biceps (C6) 5 5   Wrist ext (C6) 5 5   Triceps (C7) 5 5   Wrist flex (C7) 5 5   EPL (C8)  5 5   Interossei (T1) 5 5     Strength Scapular: 5/5 MMT Scale   R L   Upper trap (C4) 5 5   Rhomboids 5 4+   Mid trap 5 4+   Lower trap 5 4+   Serratus 4+ 4     Palpation:  Note L mid thoracic and lower rib flare with TTP of the L intercostals     1/3/2025  Visit #1 1/20/2025  Visit #2   Manual Therapy   Prone thoracic mobs   Therapeutic Exercise Re-assessment    Therapeutic Activity     Neuromuscular Education Supine hoolkying IO//TA    Supine hooklying scapular protraction    Supine weighted triceps curls    Supine positional lift    Supine hooklying resisted R glut max PME with subscapularis    Lat dorsi inhibition holding onto the door frame    Sternal positional lift    Supine weighted scapular protraction            TNE Education     HEP upine hoolkying IO//TA    Supine hooklying scapular protraction    Supine weighted triceps curls    Supine positional lift    Supine hooklying resisted R glut max PME with subscapularis    Lat dorsi inhibition holding onto the door frame    Sternal positional lift         Assessment:  No adverse effects to treatment.  The pt displayed decreased mobility in the mid thoracic spine.  Note restrictions in anterior chest wall this date with (+) neutral tension signs.  Advised to avoid soreness in the abdomin during his HEP.      GOALS:  1.  The pt will be independent in their HEP.  MET 3/7/2024  2.  Centralization of symptoms to the cervical spine.  MET 3/7/2024  3.  The pt will be able to complete a modified workout program.  PROGRESSING 1/3/2025  4.  The pt will be able to sleep through the night without waking up from pain.  MET 3/7/2024  5.  The pt will report a 75% reduction in symptoms.  PROGRESSING  1/3/2025  6.  The pt will be able to sit though his work day without pain.  Added 1/3/2025  7.  The pt will be able to play baseball without his kids.  Added 1/3/2025  8.  The pt will be able to ice skate with his children.  Added 1/3/2025      Frequency/Duration: Patient will be seen for 1 x/week or a total of 10 visits over a 90 day period. Treatment will include: Manual Therapy; Therapeutic Exercises; Neuromuscular Re-education; Therapeutic Activity; Patient education; Home exercise program instruction; TNE Education, Modalities as needed.    Education or treatment limitation:  None  Rehab Potential: good    Charges: Man1(8), NM3(38) Total Timed Treatment: 48 in  Total Treatment Time: 48min        Certification From: 1/3/2025  To:4/3/2025

## 2025-01-27 ENCOUNTER — APPOINTMENT (OUTPATIENT)
Dept: PHYSICAL THERAPY | Facility: HOSPITAL | Age: 45
End: 2025-01-27
Attending: PHYSICAL MEDICINE & REHABILITATION
Payer: COMMERCIAL

## 2025-02-04 ENCOUNTER — OFFICE VISIT (OUTPATIENT)
Dept: PHYSICAL THERAPY | Facility: HOSPITAL | Age: 45
End: 2025-02-04
Attending: PHYSICAL MEDICINE & REHABILITATION
Payer: COMMERCIAL

## 2025-02-04 PROCEDURE — 97140 MANUAL THERAPY 1/> REGIONS: CPT | Performed by: PHYSICAL THERAPIST

## 2025-02-04 PROCEDURE — 97112 NEUROMUSCULAR REEDUCATION: CPT | Performed by: PHYSICAL THERAPIST

## 2025-02-04 NOTE — PROGRESS NOTES
2/4/2025    Patient Name: Ridge Ambrocio  YOB: 1980          MRN number:  [id number]  Referring Physician:  Ankita Madden      Dx:  Rib pain on left side (R07.81)  Chronic bilateral thoracic back pain (M54.6,G89.29)  Shoulder weakness (R29.898)             Authorized # of Visits:  5 visits approved 1/1/25 yo 4/1/25        Next MD visit: none   Fall Risk: standard         Precautions:  general  Medication Changes since last visit?: No    Subjective: States he is been sore from being sick and not doing his HEP.,  States he feels tight on the L side of his rib cage and the mesh still feels \"strange\".      Pain Rating:  3/10 VAS currently, 5-6/10 VAS at the worse    Objective:     Cervical AROM: 1/3/2025   Flexion 45   Extension 50   R Sidebend 30   L Sidebend 35   R Rotation 70   L Rotation 60   Protrusion WFL   Retraction WFL         NIKKI Testing:    NIKKI Testing R L   Cervical Axial Rotation - -   Apical Expansion decreased WFL   Adduction Drop Test - -   Extension Drop Test NT NT   SLR 75 75   Trunk Rotation NT NT   Posterior Mediastinum Expansion Test WFL WFL   Standing Reach Test - -   Elevated and ER Anterior Rib no no       Shoulder AROM:      R    L   Flex 160    60   Abd 165 165   ER Hand behind head Hand behind head   IR Hand to waist Hand to waist   Hort abd 45 degrees 45 degrees       Strength UE:   5/5 MMT Scale   R  L   Shoulder flex  5     5   Shoulder ext NT NT   Abduction (C5) 5 5   ER 4+ 4+   IR 5 5   Biceps (C6) 5 5   Wrist ext (C6) 5 5   Triceps (C7) 5 5   Wrist flex (C7) 5 5   EPL (C8)  5 5   Interossei (T1) 5 5     Strength Scapular: 5/5 MMT Scale   R L   Upper trap (C4) 5 5   Rhomboids 5 4+   Mid trap 5 4+   Lower trap 5 4+   Serratus 4+ 4     Palpation:  Note L mid thoracic and lower rib flare with TTP of the L intercostals     1/3/2025  Visit #1 1/20/2025  Visit #2 2/4/2025  Visit #3   Manual Therapy  Prone thoracic mobs STM L lateral rib cage   Therapeutic Exercise  Re-assessment     Therapeutic Activity      Neuromuscular Education Supine hoolkying IO//TA    Supine hooklying scapular protraction    Supine weighted triceps curls    Supine positional lift    Supine hooklying resisted R glut max PME with subscapularis    Lat dorsi inhibition holding onto the door frame    Sternal positional lift    Supine weighted scapular protraction          Supine hooklying IO/TA    Supine hooklying T8 extension    Weighted scapular protraction with #2 weights    Sternal positional lift    Sternal positional lift with neutral flossing       TNE Education      HEP upine hoolkying IO//TA    Supine hooklying scapular protraction    Supine weighted triceps curls    Supine positional lift    Supine hooklying resisted R glut max PME with subscapularis    Lat dorsi inhibition holding onto the door frame    Sternal positional lift Supine hooklying IO/TA    Supine hooklying T8 extension         Assessment:  No adverse effects to treatment.  Able to engage his abdominals without strain this date but continues to display TTP of the L lateral rib cage with intercostal release.  The pt will restart the exercises listed above.      Plan:  Continue manual therapy, progress thoracic rotation activities while improving core pressure control    GOALS:  1.  The pt will be independent in their HEP.  MET 3/7/2024  2.  Centralization of symptoms to the cervical spine.  MET 3/7/2024  3.  The pt will be able to complete a modified workout program.  PROGRESSING 1/3/2025  4.  The pt will be able to sleep through the night without waking up from pain.  MET 3/7/2024  5.  The pt will report a 75% reduction in symptoms.  PROGRESSING  1/3/2025  6.  The pt will be able to sit though his work day without pain.  Added 1/3/2025  7.  The pt will be able to play baseball without his kids.  Added 1/3/2025  8.  The pt will be able to ice skate with his children.  Added 1/3/2025      Frequency/Duration: Patient will be seen for 1  x/week or a total of 10 visits over a 90 day period. Treatment will include: Manual Therapy; Therapeutic Exercises; Neuromuscular Re-education; Therapeutic Activity; Patient education; Home exercise program instruction; TNE Education, Modalities as needed.    Education or treatment limitation: None  Rehab Potential: good    Charges: Man1(8), NM3(38) Total Timed Treatment: 48 min  Total Treatment Time: 48 min        Certification From: 1/3/2025  To:4/3/2025

## 2025-02-10 ENCOUNTER — APPOINTMENT (OUTPATIENT)
Dept: PHYSICAL THERAPY | Facility: HOSPITAL | Age: 45
End: 2025-02-10
Attending: PHYSICAL MEDICINE & REHABILITATION
Payer: COMMERCIAL

## 2025-02-10 ENCOUNTER — TELEPHONE (OUTPATIENT)
Dept: PHYSICAL THERAPY | Facility: HOSPITAL | Age: 45
End: 2025-02-10

## 2025-02-20 ENCOUNTER — OFFICE VISIT (OUTPATIENT)
Dept: PHYSICAL THERAPY | Facility: HOSPITAL | Age: 45
End: 2025-02-20
Attending: FAMILY MEDICINE
Payer: COMMERCIAL

## 2025-02-20 PROCEDURE — 97140 MANUAL THERAPY 1/> REGIONS: CPT | Performed by: PHYSICAL THERAPIST

## 2025-02-20 PROCEDURE — 97112 NEUROMUSCULAR REEDUCATION: CPT | Performed by: PHYSICAL THERAPIST

## 2025-02-20 NOTE — PROGRESS NOTES
2/20/2025    Patient Name: Ridge Ambrocio  YOB: 1980          MRN number:  [id number]  Referring Physician:  Ankita Madden      Dx:  Rib pain on left side (R07.81)  Chronic bilateral thoracic back pain (M54.6,G89.29)  Shoulder weakness (R29.898)             Authorized # of Visits:  5 visits approved 1/1/25 yo 4/1/25        Next MD visit: none   Fall Risk: standard         Precautions:  general  Medication Changes since last visit?: No    Subjective: States he is feeling  better overall.  States he is getting use to the mesh in his stomach. Reports he has some pain in the anterior chest wall today.  Reports he is no longer using his rib belt.  States he is working on getting to his HEP.    Pain Rating:  3/10 VAS currently, 5-6/10 VAS at the worse    Objective:     Cervical AROM: 1/3/2025   Flexion 45   Extension 50   R Sidebend 30   L Sidebend 35   R Rotation 70   L Rotation 60   Protrusion WFL   Retraction WFL         NIKKI Testing:    NIKKI Testing R L   Cervical Axial Rotation - -   Apical Expansion decreased WFL   Adduction Drop Test - -   Extension Drop Test NT NT   SLR 75 75   Trunk Rotation NT NT   Posterior Mediastinum Expansion Test WFL WFL   Standing Reach Test - -   Elevated and ER Anterior Rib no no       Shoulder AROM:      R    L   Flex 160    60   Abd 165 165   ER Hand behind head Hand behind head   IR Hand to waist Hand to waist   Hort abd 45 degrees 45 degrees       Strength UE:   5/5 MMT Scale   R  L   Shoulder flex  5     5   Shoulder ext NT NT   Abduction (C5) 5 5   ER 4+ 4+   IR 5 5   Biceps (C6) 5 5   Wrist ext (C6) 5 5   Triceps (C7) 5 5   Wrist flex (C7) 5 5   EPL (C8)  5 5   Interossei (T1) 5 5     Strength Scapular: 5/5 MMT Scale   R L   Upper trap (C4) 5 5   Rhomboids 5 4+   Mid trap 5 4+   Lower trap 5 4+   Serratus 4+ 4     Palpation:  Note L mid thoracic and lower rib flare with TTP of the L intercostals     1/3/2025  Visit #1 1/20/2025  Visit #2 2/4/2025  Visit #3  2/20/2025  Visit #4   Manual Therapy  Prone thoracic mobs STM L lateral rib cage Stm L lateral rib cage   Therapeutic Exercise Re-assessment      Therapeutic Activity       Neuromuscular Education Supine hoolkying IO//TA    Supine hooklying scapular protraction    Supine weighted triceps curls    Supine positional lift    Supine hooklying resisted R glut max PME with subscapularis    Lat dorsi inhibition holding onto the door frame    Sternal positional lift    Supine weighted scapular protraction          Supine hooklying IO/TA    Supine hooklying T8 extension    Weighted scapular protraction with #2 weights    Sternal positional lift    Sternal positional lift with neutral flossing     Supine hooklying IO/TA    Supine bar reach    Sternal positional lift    Modified all 4 belly lift    Modiified all 4 belly lift with arm lifts   TNE Education       HEP upine hoolkying IO//TA    Supine hooklying scapular protraction    Supine weighted triceps curls    Supine positional lift    Supine hooklying resisted R glut max PME with subscapularis    Lat dorsi inhibition holding onto the door frame    Sternal positional lift Supine hooklying IO/TA    Supine hooklying T8 extension Continue current HEP         Assessment:  No adverse effects to treatment.  Improved ability to tolerate quadraped and lift arms.  Continues to be TTP fo the L lateral rib cage    Plan:  Continue manual therapy, progress thoracic rotation activities while improving core pressure control    GOALS:  1.  The pt will be independent in their HEP.  MET 3/7/2024  2.  Centralization of symptoms to the cervical spine.  MET 3/7/2024  3.  The pt will be able to complete a modified workout program.  PROGRESSING 1/3/2025  4.  The pt will be able to sleep through the night without waking up from pain.  MET 3/7/2024  5.  The pt will report a 75% reduction in symptoms.  PROGRESSING  1/3/2025  6.  The pt will be able to sit though his work day without pain.  Added  1/3/2025  7.  The pt will be able to play baseball without his kids.  Added 1/3/2025  8.  The pt will be able to ice skate with his children.  Added 1/3/2025      Frequency/Duration: Patient will be seen for 1 x/week or a total of 10 visits over a 90 day period. Treatment will include: Manual Therapy; Therapeutic Exercises; Neuromuscular Re-education; Therapeutic Activity; Patient education; Home exercise program instruction; TNE Education, Modalities as needed.    Education or treatment limitation: None  Rehab Potential: good    Charges: Man1(8), NM3(38) Total Timed Treatment: 48 min  Total Treatment Time: 48 min        Certification From: 1/3/2025  To:4/3/2025

## 2025-03-11 ENCOUNTER — OFFICE VISIT (OUTPATIENT)
Dept: PHYSICAL THERAPY | Facility: HOSPITAL | Age: 45
End: 2025-03-11
Attending: FAMILY MEDICINE
Payer: COMMERCIAL

## 2025-03-11 PROCEDURE — 97140 MANUAL THERAPY 1/> REGIONS: CPT | Performed by: PHYSICAL THERAPIST

## 2025-03-11 PROCEDURE — 97530 THERAPEUTIC ACTIVITIES: CPT | Performed by: PHYSICAL THERAPIST

## 2025-03-11 NOTE — PROGRESS NOTES
3/11/2025    Patient Name: Ridge Ambrocio  YOB: 1980          MRN number:  [id number]  Referring Physician:  Ankita Madden      Dx:  Rib pain on left side (R07.81)  Chronic bilateral thoracic back pain (M54.6,G89.29)  Shoulder weakness (R29.898)             Authorized # of Visits:  5 visits approved 1/1/25 yo 4/1/25        Next MD visit: none   Fall Risk: standard         Precautions:  general  Medication Changes since last visit?: No    Subjective: States his sinus have been dry and he has been blowing his nose a lot.  Has been working on his breathing more recently.  Pain can be 4/10 VAS in the morning.  States he can tolerate sitting.  Reports the manual therapy helps the most.  Pain Rating:  3/10 VAS    Objective:     Cervical AROM: 1/3/2025   Flexion 45   Extension 50   R Sidebend 30   L Sidebend 35   R Rotation 70   L Rotation 60   Protrusion WFL   Retraction WFL         NIKKI Testing:    NIKKI Testing R L   Cervical Axial Rotation - -   Apical Expansion decreased WFL   Adduction Drop Test - -   Extension Drop Test NT NT   SLR 75 75   Trunk Rotation NT NT   Posterior Mediastinum Expansion Test WFL WFL   Standing Reach Test - -   Elevated and ER Anterior Rib no no       Shoulder AROM:      R    L   Flex 160    60   Abd 165 165   ER Hand behind head Hand behind head   IR Hand to waist Hand to waist   Hort abd 45 degrees 45 degrees       Strength UE:   5/5 MMT Scale   R  L   Shoulder flex  5     5   Shoulder ext NT NT   Abduction (C5) 5 5   ER 4+ 4+   IR 5 5   Biceps (C6) 5 5   Wrist ext (C6) 5 5   Triceps (C7) 5 5   Wrist flex (C7) 5 5   EPL (C8)  5 5   Interossei (T1) 5 5     Strength Scapular: 5/5 MMT Scale   R L   Upper trap (C4) 5 5   Rhomboids 5 4+   Mid trap 5 4+   Lower trap 5 4+   Serratus 4+ 4     Palpation:  Note L mid thoracic and lower rib flare with TTP of the L intercostals     2/4/2025  Visit #3 2/20/2025  Visit #4 3/11/2025  Visit #5   Manual Therapy STM L lateral rib cage Stm L  lateral rib cage STM L lateral rib cage    L rib cage IR mobs    Tspine mobs in prone       Therapeutic Exercise      Therapeutic Activity   Education on rib cage mobility and need to continue deep breathing activities    Education to increase activity levels at home   Neuromuscular Education Supine hooklying IO/TA    Supine hooklying T8 extension    Weighted scapular protraction with #2 weights    Sternal positional lift    Sternal positional lift with neutral flossing     Supine hooklying IO/TA    Supine bar reach    Sternal positional lift    Modified all 4 belly lift    Modiified all 4 belly lift with arm lifts    TNE Education      HEP Supine hooklying IO/TA    Supine hooklying T8 extension Continue current HEP Continue current HEP         Assessment:  No adverse effects to treatment. Continued manual therapy this date secondary to the patient reporting relief from manual therapy sessions. Encouraged increased movement at home with lower rib IR and upper rib cage rotation.  Encouraged PME expansion.  Advised to continue his current HEP.    Plan:  Continue manual therapy, progress thoracic rotation activities while improving core pressure control    GOALS:  1.  The pt will be independent in their HEP.  MET 3/7/2024  2.  Centralization of symptoms to the cervical spine.  MET 3/7/2024  3.  The pt will be able to complete a modified workout program.  PROGRESSING 1/3/2025  4.  The pt will be able to sleep through the night without waking up from pain.  MET 3/7/2024  5.  The pt will report a 75% reduction in symptoms.  PROGRESSING  1/3/2025  6.  The pt will be able to sit though his work day without pain.  Added 1/3/2025  7.  The pt will be able to play baseball without his kids.  Added 1/3/2025  8.  The pt will be able to ice skate with his children.  Added 1/3/2025      Frequency/Duration: Patient will be seen for 1 x/week or a total of 10 visits over a 90 day period. Treatment will include: Manual Therapy;  Therapeutic Exercises; Neuromuscular Re-education; Therapeutic Activity; Patient education; Home exercise program instruction; TNE Education, Modalities as needed.    Education or treatment limitation: None  Rehab Potential: good    Charges: Man3 (38), TA1(8) Total Timed Treatment: 46 min  Total Treatment Time: 46 min        Certification From: 1/3/2025  To:4/3/2025

## 2025-03-18 ENCOUNTER — OFFICE VISIT (OUTPATIENT)
Dept: PHYSICAL THERAPY | Facility: HOSPITAL | Age: 45
End: 2025-03-18
Attending: FAMILY MEDICINE
Payer: COMMERCIAL

## 2025-03-18 PROCEDURE — 97530 THERAPEUTIC ACTIVITIES: CPT | Performed by: PHYSICAL THERAPIST

## 2025-03-18 PROCEDURE — 97140 MANUAL THERAPY 1/> REGIONS: CPT | Performed by: PHYSICAL THERAPIST

## 2025-03-18 NOTE — PROGRESS NOTES
Patient: Ridge Ambrocio (44 year old, male) Referring Provider:  Insurance:   Diagnosis:   [facility] Madden  [facility]   Date of Surgery: No data recorded Next MD visit:  N/A   Precautions:    No data recorded Referral Information:    Date of Evaluation: Req: 10, Auth: 10, Exp: 6/1/2025    No data recorded POC Auth Visits:  10       Today's Date   3/18/2025    Subjective  Reports he felt better after the manual treatment for several days.  Trying to be more active and do more stretching for longer periods of time.  Overall feeling better.       Pain: 2/10     Objective  TTP over the L latearal rib cage            Assessment  No adverse effects to treatment.  Pain decreasing overall.  Continues to be TTP of the L lateral rib cage.    Goals (to be met in 10 visits)   GOALS:  1.  The pt will be independent in their HEP.  MET 3/7/2024  2.  Centralization of symptoms to the cervical spine.  MET 3/7/2024  3.  The pt will be able to complete a modified workout program.  PROGRESSING 1/3/2025  4.  The pt will be able to sleep through the night without waking up from pain.  MET 3/7/2024  5.  The pt will report a 75% reduction in symptoms.  PROGRESSING  1/3/2025  6.  The pt will be able to sit though his work day without pain.  Added 1/3/2025  7.  The pt will be able to play baseball without his kids.  Added 1/3/2025  8.  The pt will be able to ice skate with his children.  Added 1/3/2025        Plan  Cont PT - add ZOA activities as tolerated    Treatment Last 4 Visits       3/18/2025   PT Treatment   Treatment Day 6          3/18/2025   Spine Treatment   Treatment Day 6   Manual Therapy - STM L Lateral rib cage  - MFR L lateral rib cage  - IR joint mobs L lateral rib cage     Therapeutic Activity - education on L sternal turn L lateral rib cage   Manual Therapy Minutes 38   Therapeutic Activity Minutes 8   Total Time Of Timed Procedures 46   Total Time Of Service-Based Procedures 0   Total Treatment Time 46   HEP Continue  current HEP        HEP  Continue current HEP    Charges  Man3, TA1

## 2025-03-25 ENCOUNTER — OFFICE VISIT (OUTPATIENT)
Dept: PHYSICAL THERAPY | Facility: HOSPITAL | Age: 45
End: 2025-03-25
Attending: FAMILY MEDICINE
Payer: COMMERCIAL

## 2025-03-25 PROCEDURE — 97140 MANUAL THERAPY 1/> REGIONS: CPT | Performed by: PHYSICAL THERAPIST

## 2025-03-25 PROCEDURE — 97112 NEUROMUSCULAR REEDUCATION: CPT | Performed by: PHYSICAL THERAPIST

## 2025-03-25 NOTE — PROGRESS NOTES
Patient: Ridge Ambrocio (44 year old, male) Referring Provider:  Insurance:   Diagnosis:   Ankita Madden  [employer] [place]   Date of Surgery: No data recorded Next MD visit:  N/A   Precautions:    No data recorded Referral Information:    Date of Evaluation: Req: 10, Auth: 10, Exp: 6/1/2025    No data recorded POC Auth Visits:  10       Today's Date   3/25/2025    Subjective  Reports he went bowling for the first time last night.  States he used a light ball.  States he is muscle sore but not bad pain.  Still has some motions that bother him.       Pain: 3/10     Objective  decrease ability to turn his sterum to the L            Assessment  No adverse effects to treatment. Th ept was able to bowl without an increase inpain but cotinues to have some pain with certain movements.  Less TTP of the L lateral flank this date.    Goals (to be met in 10 visits)   GOALS:  1.  The pt will be independent in their HEP.  MET 3/7/2024  2.  Centralization of symptoms to the cervical spine.  MET 3/7/2024  3.  The pt will be able to complete a modified workout program.  PROGRESSING 1/3/2025  4.  The pt will be able to sleep through the night without waking up from pain.  MET 3/7/2024  5.  The pt will report a 75% reduction in symptoms.  PROGRESSING  1/3/2025  6.  The pt will be able to sit though his work day without pain.  Added 1/3/2025  7.  The pt will be able to play baseball without his kids.  Added 1/3/2025  8.  The pt will be able to ice skate with his children.  Added 1/3/2025            Plan  Cont PT - continue altering reachign activiites    Treatment Last 4 Visits       3/18/2025 3/25/2025   PT Treatment   Treatment Day 6 7          3/18/2025 3/25/2025   Spine Treatment   Treatment Day 6 7   Neuro Re-Education  -Seated Resisted Serratus Punch with Left Hamstrings  - modified all 4 belly lift with R arm lift  - alternating triceps with emphasize on L side compression   Manual Therapy - STM L Lateral rib cage  - MFR L lateral  rib cage  - IR joint mobs L lateral rib cage   - Intercostal release L flank  - MFR L flank  - ribs mobs for IR L flank   Therapeutic Activity - education on L sternal turn L lateral rib cage    Neuro Re-Educ Minutes  23   Manual Therapy Minutes 38 23   Therapeutic Activity Minutes 8    Total Time Of Timed Procedures 46 46   Total Time Of Service-Based Procedures 0 0   Total Treatment Time 46 46   HEP Continue current HEP -Seated Resisted Serratus Punch with Left Hamstrings  - modified all 4 belly lift with R arm lift  - alternating triceps with emphasize on L side compression        HEP  -Seated Resisted Serratus Punch with Left Hamstrings  - modified all 4 belly lift with R arm lift  - alternating triceps with emphasize on L side compression    Charges  Man2, NM2

## 2025-04-01 ENCOUNTER — OFFICE VISIT (OUTPATIENT)
Dept: PHYSICAL THERAPY | Facility: HOSPITAL | Age: 45
End: 2025-04-01
Attending: FAMILY MEDICINE
Payer: COMMERCIAL

## 2025-04-01 PROCEDURE — 97140 MANUAL THERAPY 1/> REGIONS: CPT | Performed by: PHYSICAL THERAPIST

## 2025-04-01 PROCEDURE — 97112 NEUROMUSCULAR REEDUCATION: CPT | Performed by: PHYSICAL THERAPIST

## 2025-04-01 NOTE — PROGRESS NOTES
Patient: Ridge Ambrocio (44 year old, male) Referring Provider:  Insurance:   Diagnosis:   [facility] Madd[facility]  [facility]   Date of Surgery: No data recorded Next MD visit:  N/A   Precautions:    No data recorded Referral Information:    Date of Evaluation: Req: 10, Auth: 10, Exp: 6/1/2025    No data recorded POC Auth Visits:  10       Today's Date   4/1/2025    Subjective  Reports he replaces some fence posts over the weekend and so his entire body is sore.  States he notices more pain in the L flank that in any other place in his body.       Pain: 4/10     Objective  L scapula elevated and protracted with slight medial border winging         Assessment  No adverse effects to treatment.  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  Address with subclavious release and corrective exercises listed below.  Updated HEP.    Goals (to be met in 10 visits)   GOALS:  1.  The pt will be independent in their HEP.  MET 3/7/2024  2.  Centralization of symptoms to the cervical spine.  MET 3/7/2024  3.  The pt will be able to complete a modified workout program.  PROGRESSING 1/3/2025  4.  The pt will be able to sleep through the night without waking up from pain.  MET 3/7/2024  5.  The pt will report a 75% reduction in symptoms.  PROGRESSING  1/3/2025  6.  The pt will be able to sit though his work day without pain.  Added 1/3/2025  7.  The pt will be able to play baseball without his kids.  Added 1/3/2025  8.  The pt will be able to ice skate with his children.  Added 1/3/2025                Plan  Cont PT - progress subscap activities    Treatment Last 4 Visits  Treatment Day: 8       3/18/2025 3/25/2025 4/1/2025   Spine Treatment   Neuro Re-Education  -Seated Resisted Serratus Punch with Left Hamstrings  - modified all 4 belly lift with R arm lift  - alternating triceps with emphasize on L side compression - alternating reaching in standing  -  somatesthetic squat  - L trunk lift  - R trunk lift   Manual Therapy - STM L Lateral rib cage  - MFR L lateral rib cage  - IR joint mobs L lateral rib cage   - Intercostal release L flank  - MFR L flank  - ribs mobs for IR L flank Brachial Chain Manual Techniques  2.  Sternal Technique  3.  R superior T4  4.  R subclavious    - intecostal release L low rib cage  - L lower rib IR MET         Therapeutic Activity - education on L sternal turn L lateral rib cage     Neuro Re-Educ Minutes  23 23   Manual Therapy Minutes 38 23 23   Therapeutic Activity Minutes 8     Total Time Of Timed Procedures 46 46 46   Total Time Of Service-Based Procedures 0 0 0   Total Treatment Time 46 46 46   HEP Continue current HEP -Seated Resisted Serratus Punch with Left Hamstrings  - modified all 4 belly lift with R arm lift  - alternating triceps with emphasize on L side compression - alternating reaching in standing  - somatesthetic squat  - L trunk lift  - R trunk lift        HEP  - alternating reaching in standing  - somatesthetic squat  - L trunk lift  - R trunk lift    Charges  NM2, Man2

## 2025-04-22 ENCOUNTER — PATIENT MESSAGE (OUTPATIENT)
Facility: CLINIC | Age: 45
End: 2025-04-22

## 2025-04-23 NOTE — TELEPHONE ENCOUNTER
Last office appt was 02/25/2024    Future Appointments   Date Time Provider Department Center   5/6/2025  7:15 AM Zaria Griffin, PT [facility] NEURO PT EM [facility]   5/13/2025  7:15 AM Zaria Griffin, PT [facility] NEURO PT EM [facility]   5/20/2025  7:15 AM Zaria Griffin, PT [facility] NEURO PT EM [facility]   5/27/2025  7:15 AM Zaria Griffin, PT [facility] NEURO PT EM [facility]

## 2025-05-06 ENCOUNTER — APPOINTMENT (OUTPATIENT)
Dept: PHYSICAL THERAPY | Facility: HOSPITAL | Age: 45
End: 2025-05-06
Attending: FAMILY MEDICINE
Payer: COMMERCIAL

## 2025-05-13 ENCOUNTER — APPOINTMENT (OUTPATIENT)
Dept: PHYSICAL THERAPY | Facility: HOSPITAL | Age: 45
End: 2025-05-13
Attending: FAMILY MEDICINE
Payer: COMMERCIAL

## 2025-05-20 ENCOUNTER — OFFICE VISIT (OUTPATIENT)
Dept: PHYSICAL THERAPY | Facility: HOSPITAL | Age: 45
End: 2025-05-20
Attending: FAMILY MEDICINE
Payer: COMMERCIAL

## 2025-05-20 PROCEDURE — 97112 NEUROMUSCULAR REEDUCATION: CPT | Performed by: PHYSICAL THERAPIST

## 2025-05-20 PROCEDURE — 97140 MANUAL THERAPY 1/> REGIONS: CPT | Performed by: PHYSICAL THERAPIST

## 2025-05-20 NOTE — PROGRESS NOTES
Patient: Ridge Abmrocio (44 year old, male) Referring Provider:  Insurance:   Diagnosis:   Ankita Madden  [employer]   Date of Surgery: No data recorded Next MD visit:  N/A   Precautions:    No data recorded Referral Information:    Date of Evaluation: Req: 10, Auth: 10, Exp: 6/1/2025    No data recorded POC Auth Visits:  10       Today's Date   5/20/2025    Subjective  Reports he is doing a lot of more activity.  Reports he has been doing more yard work and digging.  States his rib feels more stable.  Waking up without pain.  He can feel the mess in his stomach.       Pain: 2/10     Objective  minimal TTP of the L lateral rib cage         Assessment  No adverse effects to treatment.  The pt reports less pain overall.  Batsheva to progress to hands and knees activities.  Minimal TTP of the L lateral rib cage.      Goals (to be met in 10 visits)   GOALS:  1.  The pt will be independent in their HEP.  MET 3/7/2024  2.  Centralization of symptoms to the cervical spine.  MET 3/7/2024  3.  The pt will be able to complete a modified workout program.  PROGRESSING 1/3/2025  4.  The pt will be able to sleep through the night without waking up from pain.  MET 3/7/2024  5.  The pt will report a 75% reduction in symptoms.  PROGRESSING  1/3/2025  6.  The pt will be able to sit though his work day without pain.  Added 1/3/2025  7.  The pt will be able to play baseball without his kids.  Added 1/3/2025  8.  The pt will be able to ice skate with his children.  Added 1/3/2025                    Plan  Cont PT - x 1 more visit and then DC with HEP    Treatment Last 4 Visits  Treatment Day: 9       3/18/2025 3/25/2025 4/1/2025 5/20/2025   Spine Treatment   Neuro Re-Education  -Seated Resisted Serratus Punch with Left Hamstrings  - modified all 4 belly lift with R arm lift  - alternating triceps with emphasize on L side compression - alternating reaching in standing  - somatesthetic squat  - L trunk lift  - R trunk lift - modified all 4  belly lift  - all 4's R arm reach  - L hamstring with L arm reach in sitting  - sternal positional lift     Manual Therapy - STM L Lateral rib cage  - MFR L lateral rib cage  - IR joint mobs L lateral rib cage   - Intercostal release L flank  - MFR L flank  - ribs mobs for IR L flank Brachial Chain Manual Techniques  2.  Sternal Technique  3.  R superior T4  4.  R subclavious    - intecostal release L low rib cage  - L lower rib IR MET       - STM L lateral rib cage   Therapeutic Activity - education on L sternal turn L lateral rib cage      Neuro Re-Educ Minutes  23 23 38   Manual Therapy Minutes 38 23 23 8   Therapeutic Activity Minutes 8      Total Time Of Timed Procedures 46 46 46 46   Total Time Of Service-Based Procedures 0 0 0 0   Total Treatment Time 46 46 46 46   HEP Continue current HEP -Seated Resisted Serratus Punch with Left Hamstrings  - modified all 4 belly lift with R arm lift  - alternating triceps with emphasize on L side compression - alternating reaching in standing  - somatesthetic squat  - L trunk lift  - R trunk lift         HEP  - alternating reaching in standing  - somatesthetic squat  - L trunk lift  - R trunk lift    Charges  NM3, Man1

## 2025-05-27 ENCOUNTER — APPOINTMENT (OUTPATIENT)
Dept: PHYSICAL THERAPY | Facility: HOSPITAL | Age: 45
End: 2025-05-27
Attending: FAMILY MEDICINE
Payer: COMMERCIAL

## 2025-07-10 ENCOUNTER — OFFICE VISIT (OUTPATIENT)
Facility: CLINIC | Age: 45
End: 2025-07-10
Payer: COMMERCIAL

## 2025-07-10 VITALS
SYSTOLIC BLOOD PRESSURE: 110 MMHG | WEIGHT: 158 LBS | DIASTOLIC BLOOD PRESSURE: 64 MMHG | HEIGHT: 70 IN | OXYGEN SATURATION: 98 % | HEART RATE: 70 BPM | BODY MASS INDEX: 22.62 KG/M2

## 2025-07-10 DIAGNOSIS — R41.840 INATTENTION: Primary | ICD-10-CM

## 2025-07-10 PROCEDURE — 99213 OFFICE O/P EST LOW 20 MIN: CPT | Performed by: FAMILY MEDICINE

## 2025-07-10 PROCEDURE — 3074F SYST BP LT 130 MM HG: CPT | Performed by: FAMILY MEDICINE

## 2025-07-10 PROCEDURE — 3078F DIAST BP <80 MM HG: CPT | Performed by: FAMILY MEDICINE

## 2025-07-10 PROCEDURE — 3008F BODY MASS INDEX DOCD: CPT | Performed by: FAMILY MEDICINE

## 2025-07-10 NOTE — PROGRESS NOTES
HPI:    Patient ID: Ridge Ambrocio is a 45 year old male who presents for ADHD evaluation.    HPI  Has a number of friends who have been tested for ADHD.   He is interested.   Their symptoms resonate with him.   Both kids have ADHD.   Sx include interruption rage, decreased focus, distractibility.   He WFH.     Past Medical History[1]     Current Medications[2]     Allergies[3]    Review of Systems   See HPI        /64   Pulse 70   Ht 5' 10\" (1.778 m)   Wt 158 lb (71.7 kg)   SpO2 98%   BMI 22.67 kg/m²     PHYSICAL EXAM:   Physical Exam  Constitutional:       General: He is not in acute distress.     Appearance: Normal appearance. He is not ill-appearing, toxic-appearing or diaphoretic.   HENT:      Head: Normocephalic and atraumatic.   Eyes:      Extraocular Movements: Extraocular movements intact.      Conjunctiva/sclera: Conjunctivae normal.   Cardiovascular:      Rate and Rhythm: Normal rate and regular rhythm.      Pulses: Normal pulses.      Heart sounds: Normal heart sounds. No murmur heard.     No friction rub. No gallop.   Pulmonary:      Effort: Pulmonary effort is normal. No respiratory distress.      Breath sounds: Normal breath sounds. No wheezing, rhonchi or rales.   Musculoskeletal:      Cervical back: Neck supple.      Right lower leg: No edema.      Left lower leg: No edema.   Skin:     General: Skin is warm and dry.      Capillary Refill: Capillary refill takes less than 2 seconds.   Neurological:      General: No focal deficit present.      Mental Status: He is alert.   Psychiatric:         Mood and Affect: Mood normal.         Behavior: Behavior normal.         Thought Content: Thought content normal.         Judgment: Judgment normal.             ASSESSMENT/PLAN:     Encounter Diagnosis   Name Primary?    Inattention Yes       1. Inattention  - Neuropsychology Referral Elmer Lamar     -Recommend neuropsychology evaluation. Referral generated.  -Plan to arrange time to  discuss result report once obtained following evaluation, and will then discuss treatment options.     Meds This Visit:  Requested Prescriptions      No prescriptions requested or ordered in this encounter       Imaging & Referrals:  NEUROPSYCHOLOGY - INTERNAL       Melvin Arrieta DO  ID#[id number]       [1]   Past Medical History:   Back problem   [2]   No current outpatient medications on file.   [3]   Allergies  Allergen Reactions    Doxycycline OTHER (SEE COMMENTS)     Abdominal pressure

## 2025-07-15 ENCOUNTER — PATIENT MESSAGE (OUTPATIENT)
Facility: CLINIC | Age: 45
End: 2025-07-15

## 2025-07-15 DIAGNOSIS — R41.840 INATTENTION: Primary | ICD-10-CM

## (undated) DEVICE — V2 SPECIMEN COLLECTION TRAY: Brand: NEPTUNE

## (undated) DEVICE — LASSO POLYPECTOMY SNARE: Brand: LASSO

## (undated) DEVICE — Device

## (undated) DEVICE — 60 ML SYRINGE REGULAR TIP: Brand: MONOJECT

## (undated) DEVICE — KIT ENDO ORCAPOD 160/180/190

## (undated) DEVICE — KIT CLEAN ENDOKIT 1.1OZ GOWNX2

## (undated) DEVICE — V2 SPECIMEN COLLECTION MANIFOLD KIT: Brand: NEPTUNE

## (undated) NOTE — LETTER
3/13/2024      Jake Seay MD  Physical Medicine and Rehabilitation  [address] [place] [address], [address]  [facility] [address]  Dept: [phone]  Dept Fax: [phone]        RE: Consultation for Ridge Ambrocio        Dear Ankita Madden DO,    Thank you very much for the opportunity to see your patient.  Attached please find a summary from your patient's recent visit.     I appreciate the chance to take care of your patient with you.  Please feel free to call me with any questions or concerns.    Sincerely,        Jake Seay MD  Electronically Signed on 3/13/2024

## (undated) NOTE — LETTER
[facility]  Administration of Anesthesia  I, Ridge Ambrocio agree to be cared for by a physician anesthesiologist alone and/or with a nurse anesthetist, who is specially trained to monitor me and give me medicine to put me to sleep or keep me comfortable during my procedure    I understand that my anesthesiologist and/or anesthetist is not an employee or agent of [facility] or [employer]. He or she works for [facility]    As the patient asking for anesthesia services, I agree to:  Allow the anesthesiologist (anesthesia doctor) to give me medicine and do additional procedures as necessary. Some examples are: Starting or using an “IV” to give me medicine, fluids or blood during my procedure, and having a breathing tube placed to help me breathe when I’m asleep (intubation). In the event that my heart stops working properly, I understand that my anesthesiologist will make every effort to sustain my life, unless otherwise directed by [facility] Do Not Resuscitate documents.  Tell my anesthesia doctor before my procedure:  If I am pregnant.  The last time that I ate or drank.  iii. All of the medicines I take (including prescriptions, herbal supplements, and pills I can buy without a prescription (including street drugs/illegal medications). Failure to inform my anesthesiologist about these medicines may increase my risk of anesthetic complications.  iv.If I am allergic to anything or have had a reaction to anesthesia before.  I understand how the anesthesia medicine will help me (benefits).  I understand that with any type of anesthesia medicine there are risks:  The most common risks are: nausea, vomiting, sore throat, muscle soreness, damage to my eyes, mouth, or teeth (from breathing tube placement).  Rare risks include: remembering what happened during my procedure, allergic reactions to medications, injury to my airway, heart, lungs, vision, nerves, or  muscles and in extremely rare instances death.  My doctor has explained to me other choices available to me for my care (alternatives).  Pregnant Patients (“epidural”):  I understand that the risks of having an epidural (medicine given into my back to help control pain during labor), include itching, low blood pressure, difficulty urinating, headache or slowing of the baby’s heart. Very rare risks include infection, bleeding, seizure, irregular heart rhythms and nerve injury.  Regional Anesthesia (“spinal”, “epidural”, & “nerve blocks”):  I understand that rare but potential complications include headache, bleeding, infection, seizure, irregular heart rhythms, and nerve injury.    _____________________________________________________________________________  Patient (or Representative) Signature/Relationship to Patient  Date   Time    _____________________________________________________________________________   Name (if used)    Language/Organization   Time    _____________________________________________________________________________  Nurse Anesthetist Signature     Date   Time  _____________________________________________________________________________  Anesthesiologist Signature     Date   Time  I have discussed the procedure and information above with the patient (or patient’s representative) and answered their questions. The patient or their representative has agreed to have anesthesia services.    _____________________________________________________________________________  Witness        Date   Time  I have verified that the signature is that of the patient or patient’s representative, and that it was signed before the procedure  Patient Name: Ridge Ambrocio     : 1980                 Printed: 2025 at 9:31 AM    Medical Record #: [id number]                                            Page 1 of 1  ----------ANESTHESIA CONSENT----------

## (undated) NOTE — LETTER
12/13/2023      Devora Titus MD  Physical Medicine and Rehabilitation  [facility], [address] [place] [address]  Dept: [phone]  Dept Fax: [phone]        RE: Consultation for Seamus Núñez        Dear Tahir Velarde DO,    Thank you very much for the opportunity to see your patient. Attached please find a summary from your patient's recent visit. I appreciate the chance to take care of your patient with you. Please feel free to call me with any questions or concerns. Sincerely,        Tre Portillo.  Navid Titus MD  Electronically Signed on 12/13/2023

## (undated) NOTE — LETTER
6/12/2024      Jake Seay MD  Physical Medicine and Rehabilitation  [address] [place] [address], [address]  [facility] [address]  Dept: [phone]  Dept Fax: [phone]        RE: Consultation for Ridge Ambrocio        Dear Ankita Madden DO,    Thank you very much for the opportunity to see your patient.  Attached please find a summary from your patient's recent visit.     I appreciate the chance to take care of your patient with you.  Please feel free to call me with any questions or concerns.    Sincerely,        Jake Seay MD  Electronically Signed on 6/12/2024